# Patient Record
Sex: MALE | Race: WHITE | NOT HISPANIC OR LATINO | URBAN - METROPOLITAN AREA
[De-identification: names, ages, dates, MRNs, and addresses within clinical notes are randomized per-mention and may not be internally consistent; named-entity substitution may affect disease eponyms.]

---

## 2017-05-30 ENCOUNTER — AMBULATORY - RIVER FALLS (OUTPATIENT)
Dept: FAMILY MEDICINE | Facility: CLINIC | Age: 62
End: 2017-05-30

## 2017-08-31 ENCOUNTER — OFFICE VISIT - RIVER FALLS (OUTPATIENT)
Dept: FAMILY MEDICINE | Facility: CLINIC | Age: 62
End: 2017-08-31

## 2017-08-31 ASSESSMENT — MIFFLIN-ST. JEOR: SCORE: 1572.76

## 2017-09-01 LAB
CHOLEST SERPL-MCNC: 214 MG/DL
CHOLEST/HDLC SERPL: 3.9 {RATIO}
GLUCOSE BLD-MCNC: 84 MG/DL (ref 65–99)
HDLC SERPL-MCNC: 55 MG/DL
LDLC SERPL CALC-MCNC: 141 MG/DL
NONHDLC SERPL-MCNC: 159 MG/DL
TRIGL SERPL-MCNC: 79 MG/DL

## 2018-01-30 ENCOUNTER — OFFICE VISIT - RIVER FALLS (OUTPATIENT)
Dept: FAMILY MEDICINE | Facility: CLINIC | Age: 63
End: 2018-01-30

## 2019-02-11 ENCOUNTER — OFFICE VISIT - RIVER FALLS (OUTPATIENT)
Dept: FAMILY MEDICINE | Facility: CLINIC | Age: 64
End: 2019-02-11

## 2019-02-11 ASSESSMENT — MIFFLIN-ST. JEOR: SCORE: 1601.79

## 2019-02-12 LAB
CHOLEST SERPL-MCNC: 232 MG/DL
CHOLEST/HDLC SERPL: 4.1 {RATIO}
GLUCOSE BLD-MCNC: 83 MG/DL (ref 65–99)
HDLC SERPL-MCNC: 56 MG/DL
LDLC SERPL CALC-MCNC: 157 MG/DL
NONHDLC SERPL-MCNC: 176 MG/DL
TRIGL SERPL-MCNC: 82 MG/DL

## 2019-02-25 ENCOUNTER — OFFICE VISIT - RIVER FALLS (OUTPATIENT)
Dept: FAMILY MEDICINE | Facility: CLINIC | Age: 64
End: 2019-02-25

## 2019-03-04 ENCOUNTER — OFFICE VISIT - RIVER FALLS (OUTPATIENT)
Dept: FAMILY MEDICINE | Facility: CLINIC | Age: 64
End: 2019-03-04

## 2019-03-05 LAB — PSA SERPL-MCNC: 4 NG/ML

## 2019-03-18 ENCOUNTER — OFFICE VISIT - RIVER FALLS (OUTPATIENT)
Dept: FAMILY MEDICINE | Facility: CLINIC | Age: 64
End: 2019-03-18

## 2019-04-11 ENCOUNTER — OFFICE VISIT - RIVER FALLS (OUTPATIENT)
Dept: FAMILY MEDICINE | Facility: CLINIC | Age: 64
End: 2019-04-11

## 2019-04-11 ASSESSMENT — MIFFLIN-ST. JEOR: SCORE: 1604.51

## 2019-04-12 LAB
A/G RATIO - HISTORICAL: 1.6 (ref 1–2.5)
ALBUMIN SERPL-MCNC: 4.3 GM/DL (ref 3.6–5.1)
ALP SERPL-CCNC: 47 UNIT/L (ref 40–115)
ALT SERPL W P-5'-P-CCNC: 18 UNIT/L (ref 9–46)
AMYLASE SERPL-CCNC: 49 UNIT/L (ref 21–101)
AST SERPL W P-5'-P-CCNC: 17 UNIT/L (ref 10–35)
BILIRUB DIRECT SERPL-MCNC: 0.1 MG/DL
BILIRUB INDIRECT SERPL-MCNC: 0.3 MG/DL (ref 0.2–1.2)
BILIRUB SERPL-MCNC: 0.4 MG/DL (ref 0.2–1.2)
BUN SERPL-MCNC: 28 MG/DL (ref 7–25)
BUN/CREAT RATIO - HISTORICAL: 22 (ref 6–22)
CALCIUM SERPL-MCNC: 9.4 MG/DL (ref 8.6–10.3)
CHLORIDE BLD-SCNC: 104 MMOL/L (ref 98–110)
CO2 SERPL-SCNC: 29 MMOL/L (ref 20–32)
CREAT SERPL-MCNC: 1.3 MG/DL (ref 0.7–1.25)
EGFRCR SERPLBLD CKD-EPI 2021: 57 ML/MIN/1.73M2
ERYTHROCYTE [DISTWIDTH] IN BLOOD BY AUTOMATED COUNT: 12.8 % (ref 11–15)
GLOBULIN: 2.7 (ref 1.9–3.7)
GLUCOSE BLD-MCNC: 90 MG/DL (ref 65–99)
HCT VFR BLD AUTO: 45.6 % (ref 38.5–50)
HGB BLD-MCNC: 16.1 GM/DL (ref 13.2–17.1)
LIPASE SERPL-CCNC: 29 UNIT/L (ref 7–60)
MCH RBC QN AUTO: 29.5 PG (ref 27–33)
MCHC RBC AUTO-ENTMCNC: 35.3 GM/DL (ref 32–36)
MCV RBC AUTO: 83.5 FL (ref 80–100)
PLATELET # BLD AUTO: 195 10*3/UL (ref 140–400)
PMV BLD: 9.6 FL (ref 7.5–12.5)
POTASSIUM BLD-SCNC: 5.1 MMOL/L (ref 3.5–5.3)
PROT SERPL-MCNC: 7 GM/DL (ref 6.1–8.1)
RBC # BLD AUTO: 5.46 10*6/UL (ref 4.2–5.8)
SODIUM SERPL-SCNC: 139 MMOL/L (ref 135–146)
TSH SERPL DL<=0.005 MIU/L-ACNC: 2.6 MIU/L (ref 0.4–4.5)
WBC # BLD AUTO: 6.4 10*3/UL (ref 3.8–10.8)

## 2019-08-06 ENCOUNTER — OFFICE VISIT - RIVER FALLS (OUTPATIENT)
Dept: FAMILY MEDICINE | Facility: CLINIC | Age: 64
End: 2019-08-06

## 2019-08-20 ENCOUNTER — OFFICE VISIT - RIVER FALLS (OUTPATIENT)
Dept: FAMILY MEDICINE | Facility: CLINIC | Age: 64
End: 2019-08-20

## 2019-09-26 ENCOUNTER — OFFICE VISIT - RIVER FALLS (OUTPATIENT)
Dept: FAMILY MEDICINE | Facility: CLINIC | Age: 64
End: 2019-09-26

## 2019-09-26 ASSESSMENT — MIFFLIN-ST. JEOR: SCORE: 1599.98

## 2019-10-07 ENCOUNTER — OFFICE VISIT - RIVER FALLS (OUTPATIENT)
Dept: FAMILY MEDICINE | Facility: CLINIC | Age: 64
End: 2019-10-07

## 2019-10-07 ASSESSMENT — MIFFLIN-ST. JEOR: SCORE: 1599.07

## 2019-12-03 ENCOUNTER — OFFICE VISIT - RIVER FALLS (OUTPATIENT)
Dept: FAMILY MEDICINE | Facility: CLINIC | Age: 64
End: 2019-12-03

## 2019-12-03 ASSESSMENT — MIFFLIN-ST. JEOR: SCORE: 1580.92

## 2019-12-04 ENCOUNTER — COMMUNICATION - RIVER FALLS (OUTPATIENT)
Dept: FAMILY MEDICINE | Facility: CLINIC | Age: 64
End: 2019-12-04

## 2019-12-04 LAB
BUN SERPL-MCNC: 29 MG/DL (ref 7–25)
BUN/CREAT RATIO - HISTORICAL: 21 (ref 6–22)
CALCIUM SERPL-MCNC: 9.1 MG/DL (ref 8.6–10.3)
CHLORIDE BLD-SCNC: 106 MMOL/L (ref 98–110)
CO2 SERPL-SCNC: 27 MMOL/L (ref 20–32)
CREAT SERPL-MCNC: 1.4 MG/DL (ref 0.7–1.25)
EGFRCR SERPLBLD CKD-EPI 2021: 52 ML/MIN/1.73M2
GLUCOSE BLD-MCNC: 88 MG/DL (ref 65–99)
POTASSIUM BLD-SCNC: 4.5 MMOL/L (ref 3.5–5.3)
PSA SERPL-MCNC: 3.9 NG/ML
SODIUM SERPL-SCNC: 141 MMOL/L (ref 135–146)

## 2020-01-30 ENCOUNTER — OFFICE VISIT - RIVER FALLS (OUTPATIENT)
Dept: FAMILY MEDICINE | Facility: CLINIC | Age: 65
End: 2020-01-30

## 2020-01-30 ENCOUNTER — COMMUNICATION - RIVER FALLS (OUTPATIENT)
Dept: FAMILY MEDICINE | Facility: CLINIC | Age: 65
End: 2020-01-30

## 2020-01-30 LAB
ANION GAP SERPL CALCULATED.3IONS-SCNC: 8 MMOL/L (ref 5–18)
BUN SERPL-MCNC: 27 MG/DL (ref 8–25)
BUN/CREAT RATIO - HISTORICAL: 25 (ref 10–20)
CALCIUM SERPL-MCNC: 9.6 MG/DL (ref 8.5–10.5)
CHLORIDE BLD-SCNC: 104 MMOL/L (ref 98–110)
CO2 SERPL-SCNC: 26 MMOL/L (ref 21–31)
CREAT SERPL-MCNC: 1.08 MG/DL (ref 0.72–1.25)
D DIMER PPP FEU-MCNC: 0.29
ERYTHROCYTE [DISTWIDTH] IN BLOOD BY AUTOMATED COUNT: 13 % (ref 11.5–15.5)
GFR ESTIMATE EXT - HISTORICAL: >60
GLUCOSE BLD-MCNC: 99 MG/DL (ref 65–100)
HCT VFR BLD AUTO: 48.4 % (ref 37–53)
HGB BLD-MCNC: 16.8 G/DL (ref 13.5–17.5)
MCH RBC QN AUTO: 29.4 PG (ref 26–34)
MCHC RBC AUTO-ENTMCNC: 34.7 G/DL (ref 32–36)
MCV RBC AUTO: 85 FL (ref 80–100)
PLATELET # BLD AUTO: 198 10*3/UL (ref 140–440)
PMV BLD: 9.4 FL (ref 6.5–11)
POTASSIUM BLD-SCNC: 4.8 MMOL/L (ref 3.5–5)
RBC # BLD AUTO: 5.72 10*6/UL (ref 4.3–5.9)
SODIUM SERPL-SCNC: 138 MMOL/L (ref 135–145)
TROPONIN I - HISTORICAL: <0.01 NG/ML
TROPONIN I - HISTORICAL: <0.01 NG/ML
WBC # BLD AUTO: 6 10*3/UL (ref 4.5–11)

## 2020-01-30 ASSESSMENT — MIFFLIN-ST. JEOR: SCORE: 1603.6

## 2020-02-13 ENCOUNTER — OFFICE VISIT - RIVER FALLS (OUTPATIENT)
Dept: FAMILY MEDICINE | Facility: CLINIC | Age: 65
End: 2020-02-13

## 2022-02-11 VITALS
WEIGHT: 179 LBS | SYSTOLIC BLOOD PRESSURE: 102 MMHG | HEART RATE: 61 BPM | SYSTOLIC BLOOD PRESSURE: 124 MMHG | DIASTOLIC BLOOD PRESSURE: 78 MMHG | BODY MASS INDEX: 25.09 KG/M2 | WEIGHT: 179.2 LBS | HEART RATE: 78 BPM | HEIGHT: 71 IN | HEIGHT: 71 IN | TEMPERATURE: 97.8 F | DIASTOLIC BLOOD PRESSURE: 70 MMHG | BODY MASS INDEX: 25.06 KG/M2 | OXYGEN SATURATION: 98 %

## 2022-02-11 VITALS
TEMPERATURE: 97.1 F | SYSTOLIC BLOOD PRESSURE: 123 MMHG | WEIGHT: 173.2 LBS | BODY MASS INDEX: 24.25 KG/M2 | HEART RATE: 86 BPM | DIASTOLIC BLOOD PRESSURE: 86 MMHG | HEIGHT: 71 IN

## 2022-02-11 VITALS
DIASTOLIC BLOOD PRESSURE: 70 MMHG | HEIGHT: 71 IN | BODY MASS INDEX: 24.5 KG/M2 | OXYGEN SATURATION: 96 % | HEART RATE: 76 BPM | TEMPERATURE: 97.6 F | HEART RATE: 80 BPM | HEIGHT: 71 IN | HEIGHT: 71 IN | HEART RATE: 72 BPM | TEMPERATURE: 97.6 F | DIASTOLIC BLOOD PRESSURE: 70 MMHG | SYSTOLIC BLOOD PRESSURE: 110 MMHG | WEIGHT: 175 LBS | SYSTOLIC BLOOD PRESSURE: 126 MMHG | OXYGEN SATURATION: 95 % | DIASTOLIC BLOOD PRESSURE: 68 MMHG | WEIGHT: 180 LBS | SYSTOLIC BLOOD PRESSURE: 112 MMHG | BODY MASS INDEX: 25.2 KG/M2 | BODY MASS INDEX: 25.1 KG/M2

## 2022-02-11 VITALS
SYSTOLIC BLOOD PRESSURE: 128 MMHG | TEMPERATURE: 97.5 F | DIASTOLIC BLOOD PRESSURE: 70 MMHG | HEART RATE: 80 BPM | BODY MASS INDEX: 25.05 KG/M2 | WEIGHT: 179.6 LBS

## 2022-02-11 VITALS
HEIGHT: 71 IN | HEIGHT: 71 IN | TEMPERATURE: 97.2 F | DIASTOLIC BLOOD PRESSURE: 70 MMHG | DIASTOLIC BLOOD PRESSURE: 80 MMHG | SYSTOLIC BLOOD PRESSURE: 130 MMHG | BODY MASS INDEX: 25.05 KG/M2 | BODY MASS INDEX: 25.23 KG/M2 | SYSTOLIC BLOOD PRESSURE: 118 MMHG | HEART RATE: 78 BPM | HEART RATE: 74 BPM | OXYGEN SATURATION: 98 % | TEMPERATURE: 97.6 F | WEIGHT: 180.2 LBS

## 2022-02-11 VITALS
WEIGHT: 179.6 LBS | HEART RATE: 76 BPM | HEIGHT: 71 IN | SYSTOLIC BLOOD PRESSURE: 116 MMHG | DIASTOLIC BLOOD PRESSURE: 76 MMHG | TEMPERATURE: 97.8 F | BODY MASS INDEX: 25.15 KG/M2

## 2022-02-16 NOTE — LETTER
(Inserted Image. Unable to display)   August 22, 2019      DEANDRE WILKINSON   [address] [id number]        Dear DEANDRE,      Thank you for selecting [facility] for your healthcare needs.     You had an EGD on Tuesday August 20th, 2019. It was negative for celiac sprue, Hpylori and Eosinophilic Esophagitis. There was some chronic mild gastritis (stomach irrriation). Continue the prilosec and zoloft. Follow up in 3 weeks.            Please contact me or my assistant at [phone] if you have any questions or concerns.     Sincerely,        Alejandro Funes MD

## 2022-02-16 NOTE — PROGRESS NOTES
Patient:   YFN WILKINSON            MRN: [id number]            FIN: [id number]               Age:   65 years     Sex:  Male     :  1955   Associated Diagnoses:   Chest pain   Author:   Yfn Funes MD      Visit Information      Date of Service: 2020 04:20 pm  [facility]  Encounter#: [id number]      Primary Care Provider (PCP):  Yfn Funes MD    NPI# [id number]      Referring Provider:  Yfn Funes MD    NPI# [id number]      Chief Complaint   2020 4:29 PM CST    Follow up echo results      History of Present Illness   Started having chest pain about 3-4 weeks ago. Tried to get wood splitter going and pulled on it for an hour. Could have pulled some muscles. Pain did not happen at night. Pain happened during the day with activity. He was getting some flat steel off a shelf bothered his chest. Pain also happened when driving in a car and sitting. Pain happened several times a day for 5-15 minutes at a time. Took naprosyn and does not think it made a difference.  Able to walk to work carrying a bundle through the snow without pain  Has had a similar pain in past but goes away quicker  Had a normal stress echocardiogram a week ago and then pain has seemed to resolve. Mind felt at ease when called about normal stress test      Review of Systems   Constitutional:  No fever.    Respiratory:  No shortness of breath.    No chest pain or shortness of breath going up a flight of stairs  No leg swelling      Health Status   Allergies:    Allergic Reactions (Selected)  No Known Medication Allergies   Medications:  (Selected)   ,    Medications          No medications documented     Problem list:    All Problems  HLD (hyperlipidemia) / SNOMED CT 94378851 / Confirmed  Morbid obesity / SNOMED CT 740093136 / Probable  Tinnitus / SNOMED CT 081800178 / Confirmed      Histories   Procedure history:    Colonoscopy (SNOMED CT 205915066) performed by Yfn Funes MD on  2018 at 62 Years.  Comments:  2018 12:35 PM CST - Shruti Lawler  Sedation:  Fentanyl and Versed  Diverticuli:  Sigmoid  F/u in 10 years  Colonoscopy (SNOMED CT 092032803) performed by Yfn Funes MD on 2007 at 52 Years.  Esophagogastroduodenoscopy (SNOMED CT 438025037).  Comments:  2019 12:29 PM CDT - Yfn Funes MD  Normal EGD. Biopsies negative for EE, Hpylori, Celiac. Biopsies positive for cystic fundic gland polyp and chronic mild gastritis     Family History: Mom  of Alzheimers age 79. Dad  of CHF age 88. Sisters with hyperlipidemia. Brother and children healthy. Sister diagnosed with Pancreatic cancer age 55  Social History:  (Joan) 1975 going good. Nonsmoker. Minimal alcohol. 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 Hopes to retire . 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 Does not have to worry about year to year stress of crops any more. Has a hobby farm. Has restored tractors in the past       Physical Examination   Vital Signs   2020 4:29 PM CST Temperature Tympanic 97.6 DegF  LOW    Peripheral Pulse Rate 72 bpm    Pulse Site Radial artery    HR Method Manual    Systolic Blood Pressure 112 mmHg    Diastolic Blood Pressure 70 mmHg    Mean Arterial Pressure 84 mmHg    BP Site Right arm    BP Method Manual      Measurements from flowsheet : Measurements   2020 4:29 PM CST    Height Measured - Standard                71 in     General:  Alert and oriented, No acute distress.    Respiratory:  Lungs are clear to auscultation.    Cardiovascular:  Normal rate, Regular rhythm.    Musculoskeletal:  Normal gait.    Neurologic:  No focal deficits.    Psychiatric:  Appropriate mood & affect.       Review / Management   Stress echocardiogram (2020): no stress induced ischemia      Impression and Plan   Diagnosis     Chest pain (VHC41-JR R07.9).       Normal stress test.  Echocardiogram with possible right ventricular enlargement and will follow up with a formal echocardiogram    Addendum 2/24: Ordered Echocardiogram and hospital unable to reach him to schedule

## 2022-02-16 NOTE — NURSING NOTE
CAGE Assessment Entered On:  2/12/2019 10:07 AM CST    Performed On:  2/11/2019 10:07 AM CST by Becky Moreno               Assessment   Have you ever felt you should cut down on your drinking :   No   Have people annoyed you by criticizing your drinking :   No   Have you ever felt bad or guilty about your drinking :   No   Have you ever taken a drink first thing in the morning to steady your nerves or get rid of a hangover (Eye-opener) :   No   CAGE Score :   0    Becky Moreno - 2/12/2019 10:07 AM CST

## 2022-02-16 NOTE — NURSING NOTE
Hearing and Vision Screening Entered On:  2/11/2019 2:55 PM CST    Performed On:  2/11/2019 2:55 PM CST by Selam Partida CMA               Hearing and Vision Screening   Audiogram Result Right Ear :   Fail   Audiogram Result Left Ear :   Pass   Selam Partida CMA - 2/11/2019 2:55 PM CST   Hearing Screen Comments :   4000 Hz   Selam Partida CMA - 2/11/2019 4:11 PM CST

## 2022-02-16 NOTE — PROGRESS NOTES
Patient:   YFN WILKINSON            MRN: [id number]            FIN: [id number]               Age:   64 years     Sex:  Male     :  1955   Associated Diagnoses:   Chest pain   Author:   Yfn Funes MD      Visit Information      Date of Service: 2020 10:51 am  Performing Location: [facility]  Encounter#: [id number]      Primary Care Provider (PCP):  Yfn Funes MD    NPI# [id number]      Referring Provider:  Yfn Funes MD    NPI# [id number]      Chief Complaint   2020 10:57 AM CST   c/o chest pain for the past week, comes and goes, worse with activity, some dizziness      History of Present Illness   Started having chest pain about 10 days ago. Tried to get wood splitter going and pulled on it for an hour. Could have pulled some muscles. Pain does not happen at night. Pain happens during the day with activity.Getting some flat steel off a shelf bothered his chest today. Pain can also happen when driving in a car and sitting. Pain happening several times a day for 5-15 minutes at a time. Pain started again an hour ago.   Able to walk to work carrying a bundle through the snow without pain  Has had a similar pain in past but goes away quicker      Review of Systems   Constitutional:  No fever.    Respiratory:  No shortness of breath.    No chest pain or shortness of breath going up a flight of stairs  No leg swelling      Health Status   Allergies:    Allergic Reactions (Selected)  No Known Medication Allergies   Medications:  (Selected)      Problem list:    All Problems  HLD (hyperlipidemia) / SNOMED CT 67798426 / Confirmed  Morbid obesity / SNOMED CT 411852237 / Probable  Tinnitus / SNOMED CT 771786814 / Confirmed      Histories   Procedure history:    Colonoscopy (SNOMED CT 132487958) performed by Yfn Funes MD on 2018 at 62 Years.  Comments:  2018 12:35 PM CST - Shruti Lawler  Sedation:  Fentanyl and Versed  Diverticuli:  Sigmoid  F/u in 10  years  Colonoscopy (SNOMED CT 745127460) performed by Yfn Funes MD on 2007 at 52 Years.  Esophagogastroduodenoscopy (SNOMED CT 104491617).  Comments:  2019 12:29 PM CDT - Yfn Funes MD  Normal EGD. Biopsies negative for EE, Hpylori, Celiac. Biopsies positive for cystic fundic gland polyp and chronic mild gastritis     Family History: Mom  of Alzheimers age 79. Dad  of CHF age 88. Sisters with hyperlipidemia. Brother and children healthy. Sister diagnosed with Pancreatic cancer age 55  Social History:  (Joan) 1975 going good. Nonsmoker. Minimal alcohol. 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 Hopes to retire . 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 Does not have to worry about year to year stress of crops any more. Has a hobby farm. Has restored tractors in the past       Physical Examination   Vital Signs   2020 10:57 AM CST Temperature Tympanic 97.6 DegF  LOW    Peripheral Pulse Rate 76 bpm    Pulse Site Radial artery    HR Method Electronic    Systolic Blood Pressure 110 mmHg    Diastolic Blood Pressure 68 mmHg    Mean Arterial Pressure 82 mmHg    BP Site Right arm    BP Method Manual    Oxygen Saturation 95 %      Measurements from flowsheet : Measurements   2020 10:57 AM CST Height Measured - Standard 71 in    Weight Measured - Standard 180 lb    BSA 2.02 m2    Body Mass Index 25.1 kg/m2  HI      General:  Alert and oriented, No acute distress.    Neck:  No lymphadenopathy.    Respiratory:  Lungs are clear to auscultation.    Cardiovascular:  Normal rate, Regular rhythm.    Gastrointestinal:  Soft, Non-tender, Non-distended.    Musculoskeletal:  Normal gait.    Neurologic:  No focal deficits.    Psychiatric:  Appropriate mood & affect.       Review / Management   Results review:  Lab results   2020 11:32 AM CST Sodium Level 138 mmol/L    Potassium Level 4.8 mmol/L    Glucose Level 99  mg/dL    Creatinine Level 1.08 mg/dL    Troponin I <0.010 ng/mL    WBC 6.0    Hgb 16.8 g/dL    Platelet 198    D-Dimer 0.29     .    EKG: Sinus rhythm. Normal QTc and VT interval with no qwaves or ST changes. Good R wave progression   Chest x-ray: normal. Images reviewed with patient    Given aspirin 324mg, tylenol 1000mg and ibuprofen 800mg with resolution of pain    Follow up Troponin negative 1430      Impression and Plan   Diagnosis     Chest pain (PKL12-SP R07.9).       Likely musculoskeletal pain. Given age feel appropriate though to schedule a stress echocardiogram. Follow up in one week. Take naprosyn twice daily for a week    Addendum 2/6: Stress echocardiogram normal and talked with patient

## 2022-02-16 NOTE — NURSING NOTE
Comprehensive Intake Entered On:  10/7/2019 9:33 AM CDT    Performed On:  10/7/2019 9:30 AM CDT by Priti Tipton CMA               Summary   Chief Complaint :   Pt here for low BP, confusion, and dizziness   Weight Measured :   179 lb(Converted to: 179 lb 0 oz, 81.19 kg)    Height Measured :   71 in(Converted to: 5 ft 11 in, 180.34 cm)    Body Mass Index :   24.96 kg/m2   Body Surface Area :   2.01 m2   Systolic Blood Pressure :   102 mmHg   Diastolic Blood Pressure :   70 mmHg   Mean Arterial Pressure :   81 mmHg   Peripheral Pulse Rate :   61 bpm   Oxygen Saturation :   98 %   Race :      Languages :   English   Ethnicity :   Not  or    Priti Tipton CMA - 10/7/2019 9:30 AM CDT   Health Status   Allergies Verified? :   Yes   Medication History Verified? :   Yes   Medical History Verified? :   Yes   Pre-Visit Planning Status :   Completed   Tobacco Use? :   Never smoker   Priti Tipton CMA - 10/7/2019 9:30 AM CDT   Consents   Consent for Immunization Exchange :   Consent Granted   Consent for Immunizations to Providers :   Consent Granted   Priti Tipton CMA - 10/7/2019 9:30 AM CDT   Meds / Allergies   (As Of: 10/7/2019 9:33:17 AM CDT)   Allergies (Active)   No known allergies  Estimated Onset Date:   Unspecified ; Created By:   Ivette Reis; Reaction Status:   Active ; Category:   Drug ; Substance:   No known allergies ; Type:   Allergy ; Updated By:   Ivette Reis; Reviewed Date:   10/7/2019 9:33 AM CDT        Medication List   (As Of: 10/7/2019 9:33:17 AM CDT)   No Known Home Medications     Priti Tipton CMA - 10/7/2019 9:32:59 AM

## 2022-02-16 NOTE — TELEPHONE ENCOUNTER
Order is sent to [facility] and they will contact patient.

## 2022-02-16 NOTE — NURSING NOTE
Generalized Anxiety Disorder Screening Entered On:  2/25/2019 10:33 AM CST    Performed On:  2/25/2019 10:32 AM CST by Criselda Mary               Generalized Anxiety Disorder Screening   BUSHRA Nervous, Anxious On Edge :   Nearly every day   BUSHRA Control Worrying B :   Nearly every day   BUSHRA Worrying Too Much :   Nearly every day   BUSHRA Restless :   Not at all   BUSHRA Easily Annoyed/Irritable :   More than half the days   BUSHRA Afraid :   Nearly every day   BUSHRA Trouble Relaxing :   Nearly every day   BUSHRA Total Screening Score :   17    Criselda Mary - 2/25/2019 10:32 AM CST

## 2022-02-16 NOTE — TELEPHONE ENCOUNTER
---------------------  From: Tara Hanson CMA   Sent: 2/22/2019 9:12:35 AM CST  Subject: Results     Took direct call from FD w/ patient inquiring about his CT results. Gave verbal message from result letter. Patient was advised to f/u to review results. Transferred to scheduling

## 2022-02-16 NOTE — NURSING NOTE
Comprehensive Intake Entered On:  1/30/2020 11:02 AM CST    Performed On:  1/30/2020 10:57 AM CST by Selam Partida CMA               Summary   Chief Complaint :   c/o chest pain for the past week, comes and goes, worse with activity, some dizziness    Weight Measured :   180 lb(Converted to: 180 lb 0 oz, 81.65 kg)    Height Measured :   71 in(Converted to: 5 ft 11 in, 180.34 cm)    Body Mass Index :   25.1 kg/m2 (HI)    Body Surface Area :   2.02 m2   Systolic Blood Pressure :   110 mmHg   Diastolic Blood Pressure :   68 mmHg   Mean Arterial Pressure :   82 mmHg   Peripheral Pulse Rate :   76 bpm   BP Site :   Right arm   Pulse Site :   Radial artery   BP Method :   Manual   HR Method :   Electronic   Temperature Tympanic :   97.6 DegF(Converted to: 36.4 DegC)  (LOW)    Oxygen Saturation :   95 %   Race :      Languages :   English   Ethnicity :   Not  or    Selam Partida CMA - 1/30/2020 10:57 AM CST   Health Status   Allergies Verified? :   Yes   Medication History Verified? :   Yes   Medical History Verified? :   No   Pre-Visit Planning Status :   Not completed   Tobacco Use? :   Never smoker   Selam Partida CMA - 1/30/2020 10:57 AM CST   Consents   Consent for Immunization Exchange :   Consent Granted   Consent for Immunizations to Providers :   Consent Granted   Selam Partida CMA - 1/30/2020 10:57 AM CST   Meds / Allergies   (As Of: 1/30/2020 11:02:01 AM CST)   Allergies (Active)   No Known Medication Allergies  Estimated Onset Date:   Unspecified ; Created By:   Tara Hanson CMA; Reaction Status:   Active ; Category:   Drug ; Substance:   No Known Medication Allergies ; Type:   Allergy ; Updated By:   Tara Hanson CMA; Reviewed Date:   1/30/2020 10:57 AM CST        Medication List   (As Of: 1/30/2020 11:02:01 AM CST)

## 2022-02-16 NOTE — NURSING NOTE
Comprehensive Intake Entered On:  2/25/2019 8:11 AM CST    Performed On:  2/25/2019 8:08 AM CST by Selam Partida CMA               Summary   Chief Complaint :   Follow up CT results   Height Measured :   71 in(Converted to: 5 ft 11 in, 180.34 cm)    Systolic Blood Pressure :   118 mmHg   Diastolic Blood Pressure :   70 mmHg   Mean Arterial Pressure :   86 mmHg   Peripheral Pulse Rate :   78 bpm   BP Site :   Right arm   Pulse Site :   Radial artery   BP Method :   Manual   HR Method :   Manual   Temperature Tympanic :   97.2 DegF(Converted to: 36.2 DegC)  (LOW)    Race :      Languages :   English   Ethnicity :   Not  or    Selam Partida CMA - 2/25/2019 8:08 AM CST   Health Status   Allergies Verified? :   Yes   Medication History Verified? :   Yes   Medical History Verified? :   No   Pre-Visit Planning Status :   Completed   Tobacco Use? :   Never smoker   Selam Partida CMA - 2/25/2019 8:08 AM CST   Meds / Allergies   (As Of: 2/25/2019 8:11:49 AM CST)   Allergies (Active)   No known allergies  Estimated Onset Date:   Unspecified ; Created By:   Marie Colón CMA; Reaction Status:   Active ; Category:   Drug ; Substance:   No known allergies ; Type:   Allergy ; Updated By:   Marie Colón CMA; Reviewed Date:   2/25/2019 8:10 AM CST        Medication List   (As Of: 2/25/2019 8:11:49 AM CST)

## 2022-02-16 NOTE — TELEPHONE ENCOUNTER
Order, demographics and note faxed to [facility]. Patient is aware they will contact him to schedule.

## 2022-02-16 NOTE — RESULTS
Patient:   YFN WILKINSON            MRN: [id number]            FIN: [id number]               Age:   64 years     Sex:  Male     :  1955   Associated Diagnoses:   None   Author:   Yfn Funes MD      Procedure   EKG procedure   Indication: Chest pain.     EKG findings   Interpretation: by primary care provider.     Sinus rhythm. Normal QTc and VA interval with no qwaves or ST changes. Good R wave progression .        Impression and Plan   Diagnosis     normal EKG.

## 2022-02-16 NOTE — NURSING NOTE
Comprehensive Intake Entered On:  4/11/2019 3:11 PM CDT    Performed On:  4/11/2019 3:07 PM CDT by Selam Partida CMA               Summary   Chief Complaint :   c/o abdominal pain still not better    Weight Measured :   180.2 lb(Converted to: 180 lb 3 oz, 81.74 kg)    Height Measured :   71 in(Converted to: 5 ft 11 in, 180.34 cm)    Body Mass Index :   25.13 kg/m2 (HI)    Body Surface Area :   2.02 m2   Systolic Blood Pressure :   130 mmHg   Diastolic Blood Pressure :   80 mmHg   Mean Arterial Pressure :   97 mmHg   Peripheral Pulse Rate :   74 bpm   BP Site :   Right arm   Pulse Site :   Radial artery   BP Method :   Manual   HR Method :   Manual   Temperature Tympanic :   97.6 DegF(Converted to: 36.4 DegC)  (LOW)    Oxygen Saturation :   98 %   Race :      Languages :   English   Ethnicity :   Not  or    Selam Partida CMA - 4/11/2019 3:07 PM CDT   Health Status   Allergies Verified? :   Yes   Medication History Verified? :   Yes   Medical History Verified? :   No   Pre-Visit Planning Status :   Not completed   Tobacco Use? :   Never smoker   Selam Partida CMA - 4/11/2019 3:07 PM CDT   Demographics   Last Name :   MINH   Address :   [address]   First Name :   [place]   City :   [place]   State :   [place]   Zip Code :   [address]   Selam Partida CMA - 4/11/2019 3:07 PM CDT   Meds / Allergies   (As Of: 4/11/2019 3:11:14 PM CDT)   Allergies (Active)   No known allergies  Estimated Onset Date:   Unspecified ; Created By:   Marie Colón CMA; Reaction Status:   Active ; Category:   Drug ; Substance:   No known allergies ; Type:   Allergy ; Updated By:   Marie Colón CMA; Reviewed Date:   4/11/2019 3:08 PM CDT        Medication List   (As Of: 4/11/2019 3:11:14 PM CDT)

## 2022-02-16 NOTE — TELEPHONE ENCOUNTER
---------------------  From: Tara Hanson CMA   To: Referral Coordinators Pool ([id number]);     Sent: 3/11/2019 4:09:15 PM CDT  Subject: Reschedule Gen Surgery appt/Results     PCP:   LATIA      Time of Call:  1551       Person Calling:  Patient  Phone number:  [phone]    Returned call at: 1600    Note:   Patient called looking for lab results. Called him back to confirm it is for the PSA that was drawn on 3/4/19 per Dr Olson. Gave patient [facility]'s # and informed him to call as we cannot give out results. He also wanted to confirm an appointment for tomorrow. Documentation shows he had an appt today w/ Dr Iqbal @ 141. He states he was told it was tomorrow. Was informed a message would be sent to assist patient in rescheduling this.     Last office visit and reason:  2/25/19 Anxiety w/ LATIA---------------------  From: Yeni Lane (Referral Coordinators Pool ([id number]_[facility]))   To: Tara Hanson CMA;     Sent: 3/12/2019 9:23:57 AM CDT  Subject: RE: Reschedule Gen Surgery appt/Results     Called patient to give number to reschedule appt with Rasheed MARSHALL to call me back.

## 2022-02-16 NOTE — LETTER
(Inserted Image. Unable to display)   August 08, 2019      DEANDRE WILKINSON    [employer] [id number]        Dear DEANDRE,      Thank you for selecting [facility] for your healthcare needs.     Radiology report:    CONCLUSION:  1.  Small left hydrocele. Testicles within normal limits          Please contact me or my assistant at [phone] if you have any questions or concerns.     Sincerely,        Alejandro Funes MD

## 2022-02-16 NOTE — NURSING NOTE
Depression Screening Entered On:  2/12/2019 10:08 AM CST    Performed On:  2/11/2019 10:07 AM CST by Becky Moreno               Depression Screening   Feeling Down, Depressed, Hopeless :   Not at all   Little Interest - Pleasure in Activities :   Not at all   Initial Depression Screen Score :   0    Trouble Falling or Staying Asleep :   Not at all   Feeling Tired or Little Energy :   Not at all   Poor Appetite or Overeating :   Not at all   Feeling Bad About Yourself :   Several days   Trouble Concentrating :   Not at all   Moving or Speaking Slowly :   Not at all   Thoughts Better Off Dead or Hurting Self :   Not at all   Detailed Depression Screen Score :   1    Total Depression Screen Score :   1    BUSHRA Difficulty with Work, Home, Others :   Somewhat difficult   Becky Moreno - 2/12/2019 10:07 AM CST

## 2022-02-16 NOTE — NURSING NOTE
Depression Screening Entered On:  2/25/2019 10:33 AM CST    Performed On:  2/25/2019 10:32 AM CST by Criselda Mary               Depression Screening   Feeling Down, Depressed, Hopeless :   Several days   Little Interest - Pleasure in Activities :   Not at all   Initial Depression Screen Score :   1    Trouble Falling or Staying Asleep :   Several days   Feeling Tired or Little Energy :   Several days   Poor Appetite or Overeating :   Not at all   Feeling Bad About Yourself :   Several days   Trouble Concentrating :   Several days   Moving or Speaking Slowly :   Several days   Thoughts Better Off Dead or Hurting Self :   Not at all   Detailed Depression Screen Score :   5    Total Depression Screen Score :   6    BUSHRA Difficulty with Work, Home, Others :   Not difficult at all   Criselda Mary - 2/25/2019 10:32 AM CST

## 2022-02-16 NOTE — LETTER
(Inserted Image. Unable to display)   February 13, 2019      YFN WILKINSON       [address] [id number]        Dear YFN,    Thank you for selecting [facility] for your healthcare needs.  Below you will find the results of your recent tests done at our clinic.     No diabetes  Cholesterol little up from 2017 and risk of heart attack 12.1% over 10 years      Result Name Current Result Previous Result Reference Range   Glucose Level (mg/dL)  83 2/11/2019  84 8/31/2017 65 - 99   Cholesterol (mg/dL) ((H)) 232 2/11/2019 ((H)) 214 8/31/2017  - <200   HDL (mg/dL)  56 2/11/2019  55 8/31/2017 >40 -    LDL ((H)) 157 2/11/2019 ((H)) 141 8/31/2017    Triglyceride (mg/dL)  82 2/11/2019  79 8/31/2017  - <150       Please contact me or my assistant at [phone] if you have any questions about your results.     Sincerely,        Yfn Funes MD        What do your labs mean?  Below is a glossary of commonly ordered labs:  LDL   Bad Cholesterol   HDL   Good Cholesterol  AST/ALT   Liver Function   Cr/Creatinine   Kidney Function  Microalbumin   Kidney Function  BUN   Kidney Function  PSA   Prostate    TSH   Thyroid Hormone  HgbA1c   Diabetes Test   Hgb (Hemoglobin)   Red Blood Cells

## 2022-02-16 NOTE — PROGRESS NOTES
Patient:   YFN WILKINSON            MRN: [id number]            FIN: [id number]               Age:   64 years     Sex:  Male     :  1955   Associated Diagnoses:   Preoperative examination; Cataract of right eye   Author:   Yfn Funes MD      Preoperative Information   Indication for surgery:  Cataract.       Chief Complaint   12/3/2019 4:24 PM CST    Preoperative exam: DOS 2019 for right cataract w/ Dr Bower @ [facility]   Has had blurry vision for the past year.  Left cataract  and doing well      Review of Systems   Constitutional:  No fever, No chills, No sweats.    Ear/Nose/Mouth/Throat:  Negative.    Respiratory:  No shortness of breath.    Cardiovascular:  No chest pain.    Gastrointestinal:  No nausea, No vomiting, No diarrhea.    Genitourinary:  Negative.    Musculoskeletal:  Negative.    Integumentary:  No rash.    Neurologic:  Alert and oriented X4, No headache.    Psychiatric:  Negative.    All other systems reviewed and negative     No history of bleeding disorders or blood transfusion  No prior problems with anesthesia  No family history of anesthesia problems  No positive responses for sleep apnea  Denies plavix, coumadin  Denies history of DVT/PE  Denies recent corticosteroid use    Able to walk a flight of stairs without chest pain or shortness of breath.  No heart attack or stroke      Health Status   Allergies:    Allergic Reactions (Selected)  No Known Medication Allergies   Medications:  (Selected)      Problem list:    All Problems  HLD (hyperlipidemia) / SNOMED CT 57228701 / Confirmed  Morbid obesity / SNOMED CT 536783625 / Probable  Tinnitus / SNOMED CT 232372858 / Confirmed      Histories   Procedure history:    Colonoscopy (SNOMED CT 939350645) performed by Yfn Funes MD on 2018 at 62 Years.  Comments:  2018 12:35 PM CST - Shruti Lawler  Sedation:  Fentanyl and Versed  Diverticuli:  Sigmoid  F/u in 10 years  Colonoscopy (SNOMED CT 470376294) performed by Marin  Yfn HALL on 2007 at 52 Years.  Esophagogastroduodenoscopy (SNOMED CT 014185173).  Comments:  2019 12:29 PM CDT - Yfn Funes MD  Normal EGD. Biopsies negative for EE, Hpylori, Celiac. Biopsies positive for cystic fundic gland polyp and chronic mild gastritis     Family History: Mom  of Alzheimers age 79. Dad  of CHF age 88. Sisters with hyperlipidemia. Brother and children healthy. Sister diagnosed with Pancreatic cancer age 55  Social History:  (Joan) 1975 going good. Nonsmoker. Minimal alcohol. 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 Hopes to retire . 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 Does not have to worry about year to year stress of crops any more. Has a hobby farm. Has restored tractors in the past      Physical Examination   Vital Signs   12/3/2019 4:24 PM CST Peripheral Pulse Rate 80 bpm    Systolic Blood Pressure 126 mmHg    Diastolic Blood Pressure 70 mmHg    Mean Arterial Pressure 89 mmHg    BP Site Right arm    BP Method Manual    Oxygen Saturation 96 %      Measurements from flowsheet : Measurements   12/3/2019 4:24 PM CST Height Measured - Standard 71 in    Weight Measured - Standard 175 lb    BSA 1.99 m2    Body Mass Index 24.4 kg/m2      General:  Alert and oriented, No acute distress.    Eye:  Normal conjunctiva.    HENT:  Normocephalic, Tympanic membranes are clear.    Neck:  Non-tender, No lymphadenopathy.    Respiratory:  Lungs are clear to auscultation, Breath sounds are equal.    Cardiovascular:  Normal rate, Regular rhythm, No murmur.    Gastrointestinal:  Soft, Non-tender, Normal bowel sounds, No organomegaly.    Musculoskeletal:  Normal range of motion, Normal gait.    Integumentary:  Warm, Dry, Pink.    Neurologic:  Alert, Oriented, Normal sensory, No focal deficits.    Psychiatric:  Cooperative, Appropriate mood & affect.       Review / Management   Decreased hearing in left  ear and canal occluded by cerumen. Hearing improved with irrigation and removal of cerumen    Creatinine 1.3 April 2019 and creatinine 1.1 August 2019 and now creatinine 1.4 December 2019         Impression and Plan   Diagnosis     Preoperative examination (IOH16-DH Z01.818).     Cataract of right eye (QKR88-IE H26.9).       No contraindications to anesthesia    Mild renal insufficiency: continue to follow  Mild PSA elevation: recheck

## 2022-02-16 NOTE — PROGRESS NOTES
Patient:   DEANDRE WILKINSON            MRN: [id number]            FIN: [id number]               Age:   64 years     Sex:  Male     :  1955   Associated Diagnoses:   BPPV (benign paroxysmal positional vertigo)   Author:   Den Romeo MD      Impression and Plan   Diagnosis     BPPV (benign paroxysmal positional vertigo) (LAC92-PT H81.10).     Course:  Improving, resolving spontaneously.    Plan:    1. Conservative Management: Modify activity, Meclizine PRN, follow up  if worse or not improving.  .    Orders      Visit Information   Visit type:  New symptom.    Accompanied by:  No one.    Source of history:  Self.    History limitation:  None.       Chief Complaint   Chief complaint discussed and confirmed correct.     10/7/2019 9:30 AM CDT    Pt here for low BP, confusion, and dizziness        History of Present Illness             The patient presents with vertigo.  The vertigo is described as feeling of movement with change in head position - especially laying back..  The severity of the vertigo is moderate.  The vertigo is constant.  The vertigo has lasted for 3 hour(s).  Exacerbating factors consist of none.  Relieving factors consist of lying still and rest.  Associated symptoms consist of tinnitus, No focal weakness or loss of sensation, No fever or neck stiffness, No lightheadedness or syncope,  No palpitations., denies ear pain, denies hearing loss, denies headache, denies nausea, denies photophobia and denies visual disturbance.        Review of Systems   Constitutional:  Negative.    Eye:  Negative.    Ear/Nose/Mouth/Throat:  Negative.    Respiratory:  Negative.    Cardiovascular:  Negative.    Gastrointestinal:  Negative.    Genitourinary:  Negative.    Hematology/Lymphatics:  Negative.    Endocrine:  Negative.    Immunologic:  Negative.    Musculoskeletal:  Negative.    Integumentary:  Negative.    Neurologic:  Dizziness.    Psychiatric:  Negative.    All other systems reviewed and negative       Health Status   Allergies:    Allergic Reactions (Selected)  No known allergies   Medications:  (Selected)      Problem list:    All Problems  HLD (hyperlipidemia) / SNOMED CT 78704352 / Confirmed  Morbid obesity / SNOMED CT 835365073 / Probable  Tinnitus / SNOMED CT 802397335 / Confirmed      Histories   Past Medical History:    Active  Tinnitus (828935898): Onset in  at 39 years.  HLD (hyperlipidemia) (87477578)  Comments:  3/30/2012 CDT 9:35 AM CDT - Kathie Mejias  with elevated LDL   Family History:    Dementia  Mother (Susan, )  CA - Cancer  Grandparent  Comments:  3/30/2012 9:50 AM CDT - Kathie Mejias  prostate  Uncle (P)  Comments:  3/30/2012 9:52 AM CDT - Kathie Mejias  brain  Hypercholesterolemia  Father (Luis Fernando, )  Sister (Delfina)  Sister (Chiara)  Sister (Layla)  Hyperlipidemia  Sister (Estefania)  Sister (Tara)  CABG - Coronary artery bypass graft  Father (Luis Fernando, ): onset at 50 .  MI - Myocardial infarction  Uncle (P): onset at 51 years.  Comments:  3/30/2012 9:52 AM CDT - Kathie Mejias  2nd at 76.  CAD - Coronary artery disease  Uncle (P)  Arthritis  Father (Luis Fernando, )     Procedure history:    Colonoscopy (SNOMED CT 112189704) performed by Yfn Funes MD on 2018 at 62 Years.  Comments:  2018 12:35 PM CST - Shruti Lawler  Sedation:  Fentanyl and Versed  Diverticuli:  Sigmoid  F/u in 10 years  Colonoscopy (SNOMED CT 517158109) performed by Yfn Funes MD on 2007 at 52 Years.  Esophagogastroduodenoscopy (SNOMED CT 457520020).  Comments:  2019 12:29 PM CDT - Yfn Funes MD  Normal EGD. Biopsies negative for EE, Hpylori, Celiac. Biopsies positive for cystic fundic gland polyp and chronic mild gastritis      Physical Examination   Vital signs reviewed  and within acceptable limits    Vital Signs   10/7/2019 9:30 AM CDT Peripheral Pulse Rate 61 bpm    Systolic Blood Pressure 102 mmHg    Diastolic Blood Pressure 70 mmHg    Mean  Arterial Pressure 81 mmHg    Oxygen Saturation 98 %      Measurements from flowsheet : Measurements   10/7/2019 9:30 AM CDT Height Measured - Standard 71 in    Weight Measured - Standard 179 lb    BSA 2.01 m2    Body Mass Index 24.96 kg/m2      General:  No acute distress.    Eye:  Pupils are equal, round and reactive to light, Extraocular movements are intact, Normal conjunctiva, Vision unchanged.    HENT:  Normocephalic, Tympanic membranes are clear, Normal hearing, Oral mucosa is moist, No pharyngeal erythema, No sinus tenderness.    Neck:  Supple, No carotid bruit, No jugular venous distention, No lymphadenopathy, No thyromegaly.    Respiratory:  Lungs are clear to auscultation, Respirations are non-labored, Breath sounds are equal, Symmetrical chest wall expansion.    Cardiovascular:  Normal rate, Regular rhythm, No murmur, No gallop, Good pulses equal in all extremities, Normal peripheral perfusion, No edema.    Musculoskeletal:  Normal range of motion, Normal strength, Normal gait.    Integumentary:  Warm, Dry, Pink.    Neurologic:  Alert, Oriented, Normal sensory, Normal motor function, No focal deficits, Cranial Nerves II-XII are grossly intact, Normal deep tendon reflexes, Negative Jinny-Halpike Maneuver, Normal finger to nose and heel to shin.  Normal tandem walk..    Psychiatric:  Cooperative, Appropriate mood & affect, Normal judgment.       Review / Management   ECG interpretation:  Time  10/7/2019 10:23:00 AM, Rate  63  beats per minute, Within normal limits, Normal sinus rhythm, No ST-T changes, No ectopy, NORMAL.

## 2022-02-16 NOTE — LETTER
(Inserted Image. Unable to display)   December 04, 2019      YFN WILKINSON       [address] [id number]        Dear YFN,    Thank you for selecting [facility] for your healthcare needs.  Below you will find the results of your recent tests done at our clinic.     Creatinine a little higher. Drink one extra glass of water daily and recheck in March 2020      Result Name Current Result Previous Result Reference Range   Sodium Level (mmol/L)  141 12/3/2019  135 - 146   Potassium Level (mmol/L)  4.5 12/3/2019  3.5 - 5.3   Glucose Level (mg/dL)  88 12/3/2019  65 - 99   Creatinine Level (mg/dL) ((H)) 1.40 12/3/2019 ((H)) 1.30 4/11/2019 0.70 - 1.25       Please contact me or my assistant at [phone] if you have any questions about your results.     Sincerely,        Yfn Funes MD        What do your labs mean?  Below is a glossary of commonly ordered labs:  LDL   Bad Cholesterol   HDL   Good Cholesterol  AST/ALT   Liver Function   Cr/Creatinine   Kidney Function  Microalbumin   Kidney Function  BUN   Kidney Function  PSA   Prostate    TSH   Thyroid Hormone  HgbA1c   Diabetes Test   Hgb (Hemoglobin)   Red Blood Cells

## 2022-02-16 NOTE — PROGRESS NOTES
Patient:   YFN WILKINSON            MRN: [id number]            FIN: [id number]               Age:   64 years     Sex:  Male     :  1955   Associated Diagnoses:   Anxiety   Author:   Yfn Funes MD      Visit Information      Date of Service: 2019 04:40 pm  Performing Location: [facility]  Encounter#: [id number]      Primary Care Provider (PCP):  Yfn Funes MD    NPI# [id number]      Referring Provider:  Yfn Funes MD    NPI# [id number]      Chief Complaint   2019 4:52 PM CDT    Follow up discuss medications        History of Present Illness   Feels getting better sleep on zoloft. Stomach is much less upset. Feels good going forward. Energy better. Having a negative MRCP is reassuring to him with sister diagnosis of pancreatic cancer 2018.    Feels lousy everyday in the morning. Feels good at night. Does also feel good at times during the day. Has a mountain dew once a week and feels great after it for an hour. Gives him a boost and stomach feels good. Does feel good at time for a few hours. Able to go to work. Symptoms do not stop activity.  Upset stomach for the past 8 months. Less when sitting and resting, increased with movement. Sleeping through the night. Appetite normal. No significant weight loss. Feels in the middle of the abdomen. Does not notice it when focused on something. Eating makes him feel better.  Symptoms started when heard the news about sister having pancreatic cancer.  Son out of half-way for failure to pay child support. He is addicted to drugs. Son lives with him. It is a big stressor. One daughter in medical field and other daughter is a teacher.    Sister and brother with significant anxiety    Having some intermittent left testicle pain over the past week.   Normal Colonoscopy 2018  EGD mild gastritis 2019  MRCP negative       Review of Systems   Constitutional:  No fever.    Respiratory:  No shortness of breath.    Cardiovascular:  No chest  pain.    Gastrointestinal:  No vomiting.    Nocturia 1-2x at night  PHQ-9: 2pts (2019). 6 pts (2019). 6pts (2019)  MDQ: 1pt moderate problem (2019)  BUSHRA-7: 1pt (2019)8pts (2019). 17pts (2019)      Health Status   Allergies:    Allergic Reactions (Selected)  No Known Medication Allergies   Medications:  (Selected)   Prescriptions  Prescribed  LamISIL AT 1% topical cream: 1 milton, top, bid, # 30 gm, 0 Refill(s), Type: Maintenance, [facility]acy: [facility] #[id number], 1 milton Topical bid  Zoloft 25 mg oral tablet: = 1 tab(s) ( 25 mg ), Oral, daily, # 30 tab(s), 1 Refill(s), Type: Maintenance, [facility]acy: [facility] #[id number], 1 tab(s) Oral daily  omeprazole 20 mg oral delayed release tablet: = 1 tab(s) ( 20 mg ), po, daily, # 30 tab(s), 1 Refill(s), Type: Maintenance, [facility]acy: [facility] #[id number], 1 tab(s) Oral daily   Problem list:    All Problems  HLD (hyperlipidemia) / SNOMED CT 94658410 / Confirmed  Morbid obesity / SNOMED CT 797137363 / Probable  Tinnitus / SNOMED CT 658068693 / Confirmed      Histories   Procedure history:    Colonoscopy (SNOMED CT 272902255) performed by Yfn Funes MD on 2018 at 62 Years.  Comments:  2018 12:35 PM CST - Shruti Lawler  Sedation:  Fentanyl and Versed  Diverticuli:  Sigmoid  F/u in 10 years  Colonoscopy (SNOMED CT 529994336) performed by Yfn Funes MD on 2007 at 52 Years.  Esophagogastroduodenoscopy (SNOMED CT 962688969).  Comments:  2019 12:29 PM CDT - Yfn Funes MD  Normal EGD. Biopsies negative for EE, Hpylori, Celiac. Biopsies positive for cystic fundic gland polyp and chronic mild gastritis     Family History: Mom  of Alzheimers age 79. Dad  of CHF age 88. Sisters with hyperlipidemia. Brother and children healthy. Sister diagnosed with Pancreatic cancer age 55  Social History:  (Joan) 1975 going good. Nonsmoker. Minimal alcohol. 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 Hopes to retire 2020. 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 Does not have to worry about year to year stress of crops any more. Has a hobby farm. Has restored tractors in the past      Physical Examination   Vital Signs   9/26/2019 4:52 PM CDT Temperature Tympanic 97.8 DegF  LOW    Peripheral Pulse Rate 78 bpm    Pulse Site Radial artery    HR Method Manual    Systolic Blood Pressure 124 mmHg    Diastolic Blood Pressure 78 mmHg    Mean Arterial Pressure 93 mmHg    BP Site Right arm    BP Method Manual      Measurements from flowsheet : Measurements   9/26/2019 4:52 PM CDT Height Measured - Standard 71 in    Weight Measured - Standard 179.2 lb    BSA 2.02 m2    Body Mass Index 24.99 kg/m2      General:  Alert and oriented, No acute distress.    Neck:  No lymphadenopathy.    Respiratory:  Lungs are clear to auscultation.    Cardiovascular:  Normal rate, Regular rhythm.    Gastrointestinal:  Soft, Non-tender, Non-distended.    Musculoskeletal:  Normal gait.    Neurologic:  No focal deficits.    Psychiatric:  Appropriate mood & affect.       Impression and Plan   Diagnosis     Anxiety (DPK04-ZV F41.9).       Anxiety: improved on zoloft and likely had been causing the abdominal pain. Reassurance has helped with testing. Continue to recommend counseling. Follow up with any concerns  Patient wants to discontinue the omeprazole and zoloft for now    Prostate enlargement: nodular impression on the bladder and Urologist felt benign. Did not take flomax

## 2022-02-16 NOTE — PROGRESS NOTES
Patient:   YFN WILKINSON            MRN: [id number]            FIN: [id number]               Age:   64 years     Sex:  Male     :  1955   Associated Diagnoses:   Prostate enlargement; Anxiety   Author:   Yfn Funes MD      Visit Information      Date of Service: 2019 08:00 am  Performing Location: [facility]  Encounter#: [id number]      Primary Care Provider (PCP):  Yfn Funes MD    NPI# [id number]      Referring Provider:  Yfn Funes MD    NPI# [id number]      Chief Complaint   2019 8:08 AM CST    Follow up CT results      History of Present Illness     Upset stomach for the past 8 weeks. Less when sitting and resting, increased with movement. Sleeping through the night. Appetite normal. No significant weight loss. Feels in the middle of the abdomen. Does not notice it when focused on something. Seems to be triggered by eating in the morning.  Symptoms started when heard the new about sister.   Son in snf for failure to pay child support. He is addicted to drugs. Son lives with him. It is a big stressor.  One daughter in medical field and other daughter is a teacher.      Review of Systems   Constitutional:  No fever.    Gastrointestinal:  No vomiting.    Nocturia 1-2x at night  PHQ-9: 6pts (2019)  BUSHRA-7: 17pts (2019)      Health Status   Allergies:    Allergic Reactions (Selected)  No known allergies   Medications:  (Selected)      Problem list:    All Problems  HLD (hyperlipidemia) / SNOMED CT 13131222 / Confirmed  Morbid obesity / SNOMED CT 010818196 / Probable  Tinnitus / SNOMED CT 821936284 / Confirmed      Histories   Procedure history:    Colonoscopy (SNOMED CT 327430919) performed by Yfn Funes MD on 2018 at 62 Years.  Comments:  2018 12:35 PM CST - Shruti Lawelr  Sedation:  Fentanyl and Versed  Diverticuli:  Sigmoid  F/u in 10 years  Colonoscopy (SNOMED CT 169510127) performed by Yfn Funes MD on 2007 at 52  Years.     Family History: Mom  of Alzheimers age 79. Dad  of CHF age 88. Sisters with hyperlipidemia. Brother and children healthy. Sister diagnosed with Pancreatic cancer age 55  Social History:  (Joan) 1975 going good. Nonsmoker. Minimal alcohol. 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 Hopes to retire . 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 Does not have to worry about year to year stress of crops any more. Has a hobby farm. Has restored tractors in the past      Physical Examination   Vital Signs   2019 8:08 AM CST Temperature Tympanic 97.2 DegF  LOW    Peripheral Pulse Rate 78 bpm    Pulse Site Radial artery    HR Method Manual    Systolic Blood Pressure 118 mmHg    Diastolic Blood Pressure 70 mmHg    Mean Arterial Pressure 86 mmHg    BP Site Right arm    BP Method Manual      Measurements from flowsheet : Measurements   2019 8:08 AM CST    Height Measured - Standard                71 in     General:  Alert and oriented, No acute distress.    Respiratory:  Lungs are clear to auscultation.    Cardiovascular:  Normal rate, Regular rhythm.    Gastrointestinal:  Soft, Non-tender, Non-distended.    Musculoskeletal:  Normal gait.    Neurologic:  No focal deficits.    Psychiatric:  Appropriate mood & affect.       Review / Management   CT abdomen: 2019  CONCLUSION:   1.  Prostatic enlargement with nodular impression on the base of the bladder.  2.  Small bilateral fat-containing inguinal hernias.  3.  No acute inflammatory process or adenopathy in the abdomen or pelvis.      Impression and Plan   Diagnosis     Prostate enlargement (PPB34-OD N40.0).     Anxiety (SNX00-DJ F41.9).       Prostate enlargement: nodular impression on the bladder and refer to Urologist  Anxiety: feel that is causing his abdominal pain. Recommend counseling. Discussed possible GERD but patient declines medication at this time. GERD  handout given.    Addendum 3/9: Talked with Urology. Urology consult reviewed and no comment on CT abdomen. Discussed and Urology reviewed CT abdomen and felt enlarged prostate. Started flomax

## 2022-02-16 NOTE — LETTER
(Inserted Image. Unable to display)         March 05, 2020        YFN WILKINSON   [address] [id number]        Dear YFN,    Thank you for selecting [facility] for your healthcare needs.    Our records indicate you are due for the following services:     Non-Fasting Labs    If you had your labs done at another facility or with Direct Access Lab Testing at [facility], please bring in a copy of the results to your next visit, mail a copy, or drop off a copy of your results to your Healthcare Provider.     To schedule an appointment or if you have further questions, please contact your primary clinic:   [facility]       [phone]   [facility]       [phone]              [facility]     [phone]      Powered by [employer] (PRIMP) and [employer]    Sincerely,    Yfn Funes MD

## 2022-02-16 NOTE — LETTER
(Inserted Image. Unable to display)   December 03, 2019      DEANDRE WILKINSON    [address] [id number]        Dear DEANDRE,      Thank you for selecting [facility] for your healthcare needs.     Seen in clinic today          Please contact me or my assistant at [phone] if you have any questions or concerns.     Sincerely,        Alejandro Funes MD

## 2022-02-16 NOTE — NURSING NOTE
Depression Screening Entered On:  4/12/2019 2:41 PM CDT    Performed On:  4/12/2019 2:41 PM CDT by Tara Hanson CMA               Depression Screening   Little Interest - Pleasure in Activities :   More than half the days   Feeling Down, Depressed, Hopeless :   More than half the days   Initial Depression Screen Score :   4    Trouble Falling or Staying Asleep :   Not at all   Feeling Tired or Little Energy :   Several days   Poor Appetite or Overeating :   Not at all   Feeling Bad About Yourself :   Several days   Trouble Concentrating :   Not at all   Moving or Speaking Slowly :   Not at all   Thoughts Better Off Dead or Hurting Self :   Not at all   Detailed Depression Screen Score :   2    Total Depression Screen Score :   6    BUSHRA Difficulty with Work, Home, Others :   Somewhat difficult   Tara Hanson CMA - 4/12/2019 2:41 PM CDT

## 2022-02-16 NOTE — NURSING NOTE
Comprehensive Intake Entered On:  9/26/2019 4:57 PM CDT    Performed On:  9/26/2019 4:52 PM CDT by Selam Partida CMA               Summary   Chief Complaint :   Follow up discuss medications   Weight Measured :   179.2 lb(Converted to: 179 lb 3 oz, 81.28 kg)    Height Measured :   71 in(Converted to: 5 ft 11 in, 180.34 cm)    Body Mass Index :   24.99 kg/m2   Body Surface Area :   2.02 m2   Systolic Blood Pressure :   124 mmHg   Diastolic Blood Pressure :   78 mmHg   Mean Arterial Pressure :   93 mmHg   Peripheral Pulse Rate :   78 bpm   BP Site :   Right arm   Pulse Site :   Radial artery   BP Method :   Manual   HR Method :   Manual   Temperature Tympanic :   97.8 DegF(Converted to: 36.6 DegC)  (LOW)    Race :      Languages :   English   Ethnicity :   Not  or    Selam Partida CMA - 9/26/2019 4:52 PM CDT   Health Status   Allergies Verified? :   Yes   Medication History Verified? :   Yes   Medical History Verified? :   No   Pre-Visit Planning Status :   Completed   Tobacco Use? :   Never smoker   Selam Partida CMA - 9/26/2019 4:52 PM CDT   Meds / Allergies   (As Of: 9/26/2019 4:57:02 PM CDT)   Allergies (Active)   No Known Medication Allergies  Estimated Onset Date:   Unspecified ; Created By:   Ivette Reis; Reaction Status:   Active ; Category:   Drug ; Substance:   No Known Medication Allergies ; Type:   Allergy ; Updated By:   Ivette Reis; Reviewed Date:   9/26/2019 4:54 PM CDT        Medication List   (As Of: 9/26/2019 4:57:02 PM CDT)   Prescription/Discharge Order    omeprazole  :   omeprazole ; Status:   Prescribed ; Ordered As Mnemonic:   omeprazole 20 mg oral delayed release tablet ; Simple Display Line:   20 mg, 1 tab(s), po, daily, 30 tab(s), 1 Refill(s) ; Ordering Provider:   Yfn Funes MD; Catalog Code:   omeprazole ; Order Dt/Tm:   8/20/2019 9:00:58 AM          sertraline  :   sertraline ; Status:   Prescribed ; Ordered As Mnemonic:   Zoloft 25 mg oral tablet  ; Simple Display Line:   25 mg, 1 tab(s), Oral, daily, 30 tab(s), 1 Refill(s) ; Ordering Provider:   Yfn Funes MD; Catalog Code:   sertraline ; Order Dt/Tm:   8/20/2019 9:01:07 AM          terbinafine topical  :   terbinafine topical ; Status:   Prescribed ; Ordered As Mnemonic:   LamISIL AT 1% topical cream ; Simple Display Line:   1 milton, top, bid, 30 gm, 0 Refill(s) ; Ordering Provider:   Yfn Funes MD; Catalog Code:   terbinafine topical ; Order Dt/Tm:   8/6/2019 4:08:04 PM

## 2022-02-16 NOTE — TELEPHONE ENCOUNTER
---------------------  From: Yfn Funes MD   To: Selam Partida CMA;     Sent: 4/12/2019 9:18:47 AM CDT  Subject: General surgery consult     Massiel  Have my note and the labs sent to Dr Iqbal for the consult on April 22nd  [place] and labs were faxed to [facility] [phone]        JAYSON Partida CMA

## 2022-02-16 NOTE — NURSING NOTE
Comprehensive Intake Entered On:  2/13/2020 4:32 PM CST    Performed On:  2/13/2020 4:29 PM CST by Selam Partida CMA               Summary   Chief Complaint :   Follow up echo results   Height Measured :   71 in(Converted to: 5 ft 11 in, 180.34 cm)    Systolic Blood Pressure :   112 mmHg   Diastolic Blood Pressure :   70 mmHg   Mean Arterial Pressure :   84 mmHg   Peripheral Pulse Rate :   72 bpm   BP Site :   Right arm   Pulse Site :   Radial artery   BP Method :   Manual   HR Method :   Manual   Temperature Tympanic :   97.6 DegF(Converted to: 36.4 DegC)  (LOW)    Race :      Languages :   English   Ethnicity :   Not  or    Selam Partida CMA - 2/13/2020 4:29 PM CST   Health Status   Allergies Verified? :   Yes   Medication History Verified? :   Yes   Medical History Verified? :   No   Pre-Visit Planning Status :   Completed   Tobacco Use? :   Never smoker   Selam Partida CMA - 2/13/2020 4:29 PM CST   Consents   Consent for Immunization Exchange :   Consent Granted   Consent for Immunizations to Providers :   Consent Granted   Selam Partida CMA - 2/13/2020 4:29 PM CST   Meds / Allergies   (As Of: 2/13/2020 4:32:18 PM CST)   Allergies (Active)   No Known Medication Allergies  Estimated Onset Date:   Unspecified ; Created By:   Tara Hanson CMA; Reaction Status:   Active ; Category:   Drug ; Substance:   No Known Medication Allergies ; Type:   Allergy ; Updated By:   Tara Hanson CMA; Reviewed Date:   2/13/2020 4:30 PM CST        Medication List   (As Of: 2/13/2020 4:32:18 PM CST)

## 2022-02-16 NOTE — NURSING NOTE
Depression Screening Entered On:  9/27/2019 8:29 AM CDT    Performed On:  9/26/2019 8:29 AM CDT by Nanette Pérez               Depression Screening   Little Interest - Pleasure in Activities :   Not at all   Feeling Down, Depressed, Hopeless :   Not at all   Initial Depression Screen Score :   0    Trouble Falling or Staying Asleep :   Several days   Feeling Tired or Little Energy :   Not at all   Poor Appetite or Overeating :   Not at all   Feeling Bad About Yourself :   Several days   Trouble Concentrating :   Not at all   Moving or Speaking Slowly :   Not at all   Thoughts Better Off Dead or Hurting Self :   Not at all   Detailed Depression Screen Score :   2    Total Depression Screen Score :   2    BUSHRA Difficulty with Work, Home, Others :   Somewhat difficult   Nanette Pérez - 9/27/2019 8:29 AM CDT

## 2022-02-16 NOTE — NURSING NOTE
Generalized Anxiety Disorder Screening Entered On:  9/27/2019 8:32 AM CDT    Performed On:  9/26/2019 8:29 AM CDT by Nanette Pérez               Generalized Anxiety Disorder Screening   BUSHRA Nervous, Anxious On Edge :   Not at all   BUSHRA Control Worrying B :   Several days   BUSHRA Worrying Too Much :   Not at all   BUSHRA Restless :   Not at all   BUSHRA Easily Annoyed/Irritable :   Not at all   BUSHRA Afraid :   Not at all   BUSHRA Trouble Relaxing :   Not at all   BUSHRA Total Screening Score :   1    BUSHRA Difficulty with Work, Home, Others :   Somewhat difficult   Nanette Pérez - 9/27/2019 8:29 AM CDT

## 2022-02-16 NOTE — RESULTS
(Inserted Image. Unable to display)          (Inserted Image. Unable to display)     [address], [place] [address]  Phone [phone]      ELECTROCARDIOGRAM REPORT    DEANDRE WILKINSON :  1955  DATE OF EXAM:  10/07/2019 MRN:  [id number]   Ordering HCP:  Den Romeo MD       Indication:  BPPV.    Findings:  Rate 63 beats per minute.  AZ interval 182 milliseconds.  QT interval 378 milliseconds.  Normal axis.      Interpretation:  Normal sinus rhythm.  Normal P wave.  Normal QRS complex.  Normal ST-T wave complex.       Conclusion:  Normal.     Den Romeo MD   Interpreting HCP    JAMSHID/kyrie  D:  10/09/2019  T:  10/10/2019    ELECTROCARDIOGRAM REPORT

## 2022-02-16 NOTE — NURSING NOTE
PT was given Tylenol 1000mg, Aspirin 324 mg chewable and Ibuprofen 800 mg given per order  of Dr. Funes for chest pain at 12:10.        JAYSON Partida CMA

## 2022-02-16 NOTE — NURSING NOTE
CAGE Assessment Entered On:  2/25/2019 10:33 AM CST    Performed On:  2/25/2019 10:32 AM CST by Criselda Mary               Assessment   Have you ever felt you should cut down on your drinking :   No   Have people annoyed you by criticizing your drinking :   No   Have you ever felt bad or guilty about your drinking :   No   Have you ever taken a drink first thing in the morning to steady your nerves or get rid of a hangover (Eye-opener) :   No   CAGE Score :   0    Criselda Mary - 2/25/2019 10:32 AM CST

## 2022-02-16 NOTE — NURSING NOTE
Comprehensive Intake Entered On:  8/6/2019 3:43 PM CDT    Performed On:  8/6/2019 3:40 PM CDT by Ivette Reis               Summary   Chief Complaint :   f/up from visit this spring, continued stomach problems. Never started medications that were prescribed at last visit.    Weight Measured :   179.6 lb(Converted to: 179 lb 10 oz, 81.47 kg)    Systolic Blood Pressure :   128 mmHg   Diastolic Blood Pressure :   70 mmHg   Mean Arterial Pressure :   89 mmHg   Peripheral Pulse Rate :   80 bpm   Temperature Tympanic :   97.5 DegF(Converted to: 36.4 DegC)  (LOW)    Race :      Languages :   English   Ethnicity :   Not  or    Ivette Reis - 8/6/2019 3:40 PM CDT   Health Status   Allergies Verified? :   Yes   Medication History Verified? :   Yes   Tobacco Use? :   Never smoker   Ivette Reis - 8/6/2019 3:40 PM CDT   Meds / Allergies   (As Of: 8/6/2019 3:43:36 PM CDT)   Allergies (Active)   No Known Medication Allergies  Estimated Onset Date:   Unspecified ; Created By:   Ivette Reis; Reaction Status:   Active ; Category:   Drug ; Substance:   No Known Medication Allergies ; Type:   Allergy ; Updated By:   Ivette Reis; Reviewed Date:   8/6/2019 3:40 PM CDT        Medication List   (As Of: 8/6/2019 3:43:36 PM CDT)   Prescription/Discharge Order    omeprazole  :   omeprazole ; Status:   Prescribed ; Ordered As Mnemonic:   omeprazole 20 mg oral delayed release tablet ; Simple Display Line:   20 mg, 1 tab(s), Oral, daily, 30 tab(s), 1 Refill(s) ; Ordering Provider:   Yfn Funes MD; Catalog Code:   omeprazole ; Order Dt/Tm:   4/11/2019 3:32:37 PM          sertraline  :   sertraline ; Status:   Prescribed ; Ordered As Mnemonic:   Zoloft 25 mg oral tablet ; Simple Display Line:   25 mg, 1 tab(s), PO, Daily, 30 tab(s), 1 Refill(s) ; Ordering Provider:   Yfn Funes MD; Catalog Code:   sertraline ; Order Dt/Tm:   4/11/2019 3:32:25 PM

## 2022-02-16 NOTE — PROGRESS NOTES
Patient:   YFN WILKINSON            MRN: [id number]            FIN: [id number]               Age:   64 years     Sex:  Male     :  1955   Associated Diagnoses:   Anxiety; Abdominal pain; Balanitis; Pain in left testicle   Author:   Yfn Funes MD      Visit Information      Date of Service: 2019 03:31 pm  Performing Location: [facility]  Encounter#: [id number]      Primary Care Provider (PCP):  Yfn Funes MD    NPI# [id number]      Referring Provider:  Yfn Funes MD    NPFOX# [id number]      Chief Complaint   2019 3:40 PM CDT     f/up from visit this spring, continued stomach problems. Never started medications that were prescribed at last visit.      History of Present Illness   Feels lousy everyday in the morning. Feels good at night. Does also feel good at times during the day. Has a mountain dew once a week and feels great after it for an hour. Gives him a boost and stomach feels good. Does feel good at time for a few hours. Able to go to work. Symptoms do not stop activity.  Upset stomach for the past 8 months. Less when sitting and resting, increased with movement. Sleeping through the night. Appetite normal. No significant weight loss. Feels in the middle of the abdomen. Does not notice it when focused on something. Eating makes him feel better.  Symptoms started when heard the new about sister.   Son out of residential for failure to pay child support. He is addicted to drugs. Son lives with him. It is a big stressor.  One daughter in medical field and other daughter is a teacher.  Denies diarrhea and constipation. No blood in stool.  Sometimes feels like someone took his energy. Things pass at times.  Sister and brother with significant anxiety  No dysphagia    Having some intermittent left testicle pain over the past week.   Normal Colonoscopy 2018      Review of Systems   Constitutional:  No fever.    Respiratory:  No shortness of breath.    Cardiovascular:  No chest  pain.    Gastrointestinal:  No vomiting.    Nocturia 1-2x at night  PHQ-9: 6 pts (2019). 6pts (2019)  MDQ: 1pt moderate problem (2019)  BUSHRA-7: 8pts (2019). 17pts (2019)    No sleep apnea  No blood transfusions  No anesthesia problems  No history of DVT/PE      Health Status   Allergies:    Allergic Reactions (Selected)  No Known Medication Allergies   Medications:  (Selected)   Prescriptions  Prescribed  Zoloft 25 mg oral tablet: = 1 tab(s) ( 25 mg ), PO, Daily, # 30 tab(s), 1 Refill(s), Type: Maintenance, Pharmacy: [id number], 1 tab(s) Oral daily  omeprazole 20 mg oral delayed release tablet: = 1 tab(s) ( 20 mg ), Oral, daily, # 30 tab(s), 1 Refill(s), Type: Maintenance, Pharmacy: [id number], 1 tab(s) Oral daily   Problem list:    All Problems  HLD (hyperlipidemia) / SNOMED CT 15860310 / Confirmed  Morbid obesity / SNOMED CT 780034810 / Probable  Tinnitus / SNOMED CT 177798471 / Confirmed      Histories   Procedure history:    Colonoscopy (SNOMED CT 212142241) performed by Yfn Funes MD on 2018 at 62 Years.  Comments:  2018 12:35 PM JOSE JUAN - Shruti Lawler  Sedation:  Fentanyl and Versed  Diverticuli:  Sigmoid  F/u in 10 years  Colonoscopy (SNOMED CT 712704234) performed by Yfn Funes MD on 2007 at 52 Years.     Family History: Mom  of Alzheimers age 79. Dad  of CHF age 88. Sisters with hyperlipidemia. Brother and children healthy. Sister diagnosed with Pancreatic cancer age 55  Social History:  (Joan) 1975 going good. Nonsmoker. Minimal alcohol. 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 Hopes to retire . 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 Does not have to worry about year to year stress of crops any more. Has a hobby farm. Has restored tractors in the past      Physical Examination   Vital Signs   2019 3:40 PM CDT Temperature  Tympanic 97.5 DegF  LOW    Peripheral Pulse Rate 80 bpm    Systolic Blood Pressure 128 mmHg    Diastolic Blood Pressure 70 mmHg    Mean Arterial Pressure 89 mmHg      Measurements from flowsheet : Measurements   8/6/2019 3:40 PM CDT     Weight Measured - Standard                179.6 lb     General:  Alert and oriented, No acute distress.    HENT:  No pharyngeal erythema.    Neck:  No lymphadenopathy.    Respiratory:  Lungs are clear to auscultation.    Cardiovascular:  Normal rate, Regular rhythm.    Gastrointestinal:  Soft, Non-tender, Non-distended.    Musculoskeletal:  Normal gait.    Neurologic:  No focal deficits.    Psychiatric:  Appropriate mood & affect.    Normal testes. Erythema on lateral coronal penis      Review / Management   CT abdomen: February 2019  CONCLUSION:   1.  Prostatic enlargement with nodular impression on the base of the bladder.  2.  Small bilateral fat-containing inguinal hernias.  3.  No acute inflammatory process or adenopathy in the abdomen or pelvis.    Talked with Radiologist and unlikely mesenteric ischemia even though not CTA on review of CT abdomen         Impression and Plan   Diagnosis     Anxiety (XWD30-QU F41.9).     Abdominal pain (DCI83-BU R10.9).     Balanitis (MPL89-QO N48.1).     Pain in left testicle (YLM18-EQ N50.812).       Prostate enlargement: nodular impression on the bladder and Urologist felt benign. Did not take flomax  Anxiety: feel that is causing his abdominal pain and recommend zoloft. Recommend counseling. Discussed possible GERD and omeprazole.  Abdominal pain: feel likely anxiety but wants some testing to rule out other things. Patient desires EGD (discussed risks and benefits). Consider MRCP as well.  Left testicle pain: no abnormalities on exam. but patient concerned and would like ultrasound  Balanitis: start Lamisil

## 2022-02-16 NOTE — LETTER
(Inserted Image. Unable to display)   February 06, 2020      DEANDRE WILKINSON    [facility] [id number]        Dear DEANDRE,      Thank you for selecting [facility] for your healthcare needs.     Stress Test findings      FINAL CONCLUSIONS      1. Normal stress echocardiogram without evidence of stress-induced ischemia.      2. Excellent exercise tolerance, achieving 12.1 METs, 95.5% of max predicted heart rate, 149% of age predicted exercise capacity.      3. No diagnostic ECG evidence of ischemia.      4. Normal blood pressure response to exercise      5. Mild right ventricular chamber enlargement is incidentally noted. May consider complete TTE to evaluate further.             Please contact me or my assistant at [phone] if you have any questions or concerns.     Sincerely,        Alejandro Funes MD

## 2022-02-16 NOTE — PROGRESS NOTES
Patient:   YFN WILKINSON            MRN: [id number]            FIN: [id number]               Age:   64 years     Sex:  Male     :  1955   Associated Diagnoses:   Annual exam; Diabetes mellitus screening; Lipid screening; Abdominal pain   Author:   Yfn Funes MD      Chief Complaint   2019 2:49 PM CST    Yearly px      Well Adult History   Chronic tinnitus bilateral. Increased since not using ear protection. Attributes hearing loss left ear to environmental hearing exposure.  Working two jobs.    Upset stomach for the past 6 weeks. Less when sitting and resting, increased with movement. Sleeping through the night. Appetite normal. No signicant weight loss.       Review of Systems   Constitutional:  No fever, No weakness, No fatigue.    Eye:  No recent visual problem.    Ear/Nose/Mouth/Throat:  No ear pain.    Respiratory:  No shortness of breath, No cough, No wheezing.    Cardiovascular:  No chest pain, No peripheral edema.    Gastrointestinal:  No nausea, No vomiting, No diarrhea.    Genitourinary:  No dysuria, No change in urine stream.    Hematology/Lymphatics:  No bruising tendency, No bleeding tendency.    Endocrine:  Negative.    Immunologic:  Negative.    Musculoskeletal:  No joint pain, No decreased range of motion.    Integumentary:  No rash.    Neurologic:  No numbness, No headache.    Genitourinary: no problems with erections or intercourse. nocturia sometimes 1-2x. good stream and no frequency   All other systems reviewed and negative   PHQ-9: 1pt (2019). 2pts (2017)  CAGE: no alcohol      Health Status   Allergies:    Allergic Reactions (Selected)  No known allergies   Medications:  (Selected)      Problem list:    All Problems  HLD (hyperlipidemia) / SNOMED CT 53384978 / Confirmed  Morbid obesity / SNOMED CT 201015733 / Probable  Tinnitus / SNOMED CT 748717580 / Confirmed      Histories   Procedure history:    Colonoscopy (SNOMED CT 394319042) performed by Marin HALL,  Yfn on 2018 at 62 Years.  Comments:  2018 12:35 PM CST - Shruti Lawler  Sedation:  Fentanyl and Versed  Diverticuli:  Sigmoid  F/u in 10 years  Colonoscopy (SNOMED CT 187303795) performed by Yfn Funes MD on 2007 at 52 Years.     Family History: Mom  of Alzheimers age 79. Dad  of CHF age 88. Sisters with hyperlipidemia. Brother and children healthy. Sister diagnosed with Pancreatic cancer age 55  Social History:  (Joan) 1975 going good. Nonsmoker. Minimal alcohol. 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 Hopes to retire . 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 Does not have to worry about year to year stress of crops any more. Has a hobby farm. Has restored tractors in the past      Physical Examination   Vital Signs   2019 2:49 PM CST Temperature Tympanic 97.8 DegF  LOW    Peripheral Pulse Rate 76 bpm    Pulse Site Radial artery    HR Method Manual    Systolic Blood Pressure 116 mmHg    Diastolic Blood Pressure 76 mmHg    Mean Arterial Pressure 89 mmHg    BP Site Right arm    BP Method Manual      Measurements from flowsheet : Measurements   2019 2:49 PM CST Height Measured - Standard 71 in    Weight Measured - Standard 179.6 lb    BSA 2.02 m2    Body Mass Index 25.05 kg/m2  HI      General:  Alert and oriented, No acute distress.    Eye:  Normal conjunctiva.    HENT:  No pharyngeal erythema, normal TM's after irrigation performed.    Neck:  No lymphadenopathy, No thyromegaly.    Respiratory:  Lungs are clear to auscultation.    Cardiovascular:  Normal rate, Regular rhythm, No murmur, No edema.    Gastrointestinal:  Soft, Non-tender, Non-distended.    Genitourinary:  Normal genitalia for age and sex, No scrotal tenderness, No inguinal tenderness.    Musculoskeletal:  Normal range of motion, Normal strength, No tenderness, Normal gait.    Integumentary:  Warm, No rash.    Neurologic:  Alert,  Oriented, Normal sensory, Normal motor function, No focal deficits, Normal deep tendon reflexes.    Psychiatric:  Cooperative, Appropriate mood & affect.       Impression and Plan   Diagnosis     Annual exam (QVC39-TN Z00.00).     Diabetes mellitus screening (MWC11-DR Z13.1).     Lipid screening (HQT43-UR Z13.220).     Abdominal pain (STV65-FL R10.9).       Cardiac: risk of MI over 10 years is 11.6% 2017 numbers and discussed options of statin and Cardiac calcium score.  Colon Cancer Screen: Normal screening colonoscopy December 2007. Average risk and normal colonoscopy January 2018. Consider repeat 2028  Prostate Cancer Screen: Discussed and declines  Tinnitus: will monitor.   Hearing screen: Failed 4000Hz on the right. Hearing improved after cerumen removed with irrigation (had failed 4000Hz, 2000Hz and 1000Hz before cerumen removal)  Immunizations: Adacel 2007 and 2017. Zostavax discussed and declines  Abdominal pain: CT abdomen and pelvix with IV and oral contrast. Follow up when finished

## 2022-02-16 NOTE — LETTER
(Inserted Image. Unable to display)   September 01, 2019      DEANDREYUE WILKINSON    [facility] [id number]        Dear DEANDRE,      Thank you for selecting [facility] for your healthcare needs.     Radiologist report for MRI of the pancreas    IMPRESSION:  1.  Normal pancreas. No mass or cyst.  2.  Normal MRCP. No abnormalities of the bile ducts or gallbladder.  3.  Small inferior right hepatic lobe cyst as well as multiple bilateral renal  cysts.          Please contact me or my assistant at [phone] if you have any questions or concerns.     Sincerely,        Alejandro Funes MD

## 2022-02-16 NOTE — NURSING NOTE
Generalized Anxiety Disorder Screening Entered On:  4/12/2019 2:42 PM CDT    Performed On:  4/12/2019 2:42 PM CDT by Tara Hanson CMA               Generalized Anxiety Disorder Screening   BUSHRA Nervous, Anxious On Edge :   Several days   BUSHRA Control Worrying B :   More than half the days   BUSHRA Worrying Too Much :   More than half the days   BUSHRA Restless :   Not at all   BUSHRA Easily Annoyed/Irritable :   Not at all   BUSHRA Afraid :   Nearly every day   BUSHRA Trouble Relaxing :   Not at all   BUSHRA Total Screening Score :   8    BUSHRA Difficulty with Work, Home, Others :   Somewhat difficult   Tara Hanson CMA - 4/12/2019 2:42 PM CDT

## 2022-02-16 NOTE — NURSING NOTE
Comprehensive Intake Entered On:  2/11/2019 2:55 PM CST    Performed On:  2/11/2019 2:49 PM CST by Selam Partida CMA               Summary   Chief Complaint :   Yearly px   Weight Measured :   179.6 lb(Converted to: 179 lb 10 oz, 81.47 kg)    Height Measured :   71 in(Converted to: 5 ft 11 in, 180.34 cm)    Body Mass Index :   25.05 kg/m2 (HI)    Body Surface Area :   2.02 m2   Systolic Blood Pressure :   116 mmHg   Diastolic Blood Pressure :   76 mmHg   Mean Arterial Pressure :   89 mmHg   Peripheral Pulse Rate :   76 bpm   BP Site :   Right arm   Pulse Site :   Radial artery   BP Method :   Manual   HR Method :   Manual   Temperature Tympanic :   97.8 DegF(Converted to: 36.6 DegC)  (LOW)    Race :      Languages :   English   Ethnicity :   Not  or    Selam Partida CMA - 2/11/2019 2:49 PM CST   Health Status   Allergies Verified? :   Yes   Medication History Verified? :   Yes   Medical History Verified? :   No   Pre-Visit Planning Status :   Completed   Tobacco Use? :   Never smoker   Selam Partida CMA - 2/11/2019 2:49 PM CST   Demographics   Last Name :   MINH   Address [address]   First Name :   DEANDRE   City :   [place]   State :   [place]   Zip Code :   [id number]   Selam Partida CMA - 2/11/2019 2:49 PM CST   Consents   Consent for Immunization Exchange :   Consent Granted   Consent for Immunizations to Providers :   Consent Granted   Selam Partida CMA - 2/11/2019 2:49 PM CST   Meds / Allergies   (As Of: 2/11/2019 2:55:24 PM CST)   Allergies (Active)   No known allergies  Estimated Onset Date:   Unspecified ; Created By:   Marie Colón CMA; Reaction Status:   Active ; Category:   Drug ; Substance:   No known allergies ; Type:   Allergy ; Updated By:   Marie Colón CMA; Reviewed Date:   2/11/2019 2:52 PM CST        Medication List   (As Of: 2/11/2019 2:55:24 PM CST)

## 2022-02-16 NOTE — NURSING NOTE
Comprehensive Intake Entered On:  12/3/2019 4:27 PM CST    Performed On:  12/3/2019 4:24 PM CST by Tara Hanson CMA               Summary   Chief Complaint :   Preoperative exam: DOS 12/12/2019 for right cataract w/ Dr Bower @ [facility]   Weight Measured :   175 lb(Converted to: 175 lb 0 oz, 79.38 kg)    Height Measured :   71 in(Converted to: 5 ft 11 in, 180.34 cm)    Body Mass Index :   24.4 kg/m2   Body Surface Area :   1.99 m2   Systolic Blood Pressure :   126 mmHg   Diastolic Blood Pressure :   70 mmHg   Mean Arterial Pressure :   89 mmHg   Peripheral Pulse Rate :   80 bpm   BP Site :   Right arm   BP Method :   Manual   Oxygen Saturation :   96 %   Race :      Languages :   English   Ethnicity :   Not  or    Tara Hanson CMA - 12/3/2019 4:24 PM CST   Health Status   Allergies Verified? :   Yes   Medication History Verified? :   Yes   Medical History Verified? :   Yes   Pre-Visit Planning Status :   Completed   Tobacco Use? :   Never smoker   Tara Hanson CMA - 12/3/2019 4:24 PM CST   Consents   Consent for Immunization Exchange :   Consent Granted   Consent for Immunizations to Providers :   Consent Granted   Tara Hanson CMA - 12/3/2019 4:24 PM CST   Meds / Allergies   (As Of: 12/3/2019 4:27:14 PM CST)   Allergies (Active)   No Known Medication Allergies  Estimated Onset Date:   Unspecified ; Created By:   Priti Tipton CMA; Reaction Status:   Active ; Category:   Drug ; Substance:   No Known Medication Allergies ; Type:   Allergy ; Updated By:   Priti Tipton CMA; Reviewed Date:   12/3/2019 4:25 PM CST        Medication List   (As Of: 12/3/2019 4:27:14 PM CST)   No Known Home Medications     Tara Hanson CMA - 12/3/2019 4:25:48 PM

## 2022-02-16 NOTE — PROGRESS NOTES
Patient:   YFN WILKINSON            MRN: [id number]            FIN: [id number]               Age:   62 years     Sex:  Male     :  1955   Associated Diagnoses:   Annual exam; Diabetes mellitus screening; Lipid screening; Benign positional vertigo   Author:   Yfn Funes MD      Chief Complaint   2017 3:47 PM CDT    Here for annual exam        Well Adult History   Chronic tinnitus bilateral. Increased since not using ear protection. Attributes hearing loss left ear to environmental hearing exposure.  Feeling slightly dizzy for the past couple weeks. Able to keep working. Two weeks ago felt a little dizzy after taking a nap. Working two jobs.       Review of Systems   Constitutional:  No fever, No weakness, No fatigue.    Eye:  No recent visual problem.    Ear/Nose/Mouth/Throat:  No ear pain.    Respiratory:  No shortness of breath, No cough, No wheezing.    Cardiovascular:  No chest pain, No peripheral edema.    Gastrointestinal:  No nausea, No vomiting, No diarrhea.    Genitourinary:  No dysuria, No change in urine stream.    Hematology/Lymphatics:  No bruising tendency, No bleeding tendency.    Endocrine:  Negative.    Immunologic:  Negative.    Musculoskeletal:  No joint pain, No decreased range of motion.    Integumentary:  No rash.    Neurologic:  No numbness, No headache.    Genitourinary: no problems with erections or intercourse. nocturia sometimes 1-2x. good stream and no frequency   All other systems reviewed and negative   PHQ-9: 2pts (2017)  CAGE: no alcohol      Health Status   Allergies:    Allergic Reactions (Selected)  No known allergies   Medications:  (Selected)      Problem list:    All Problems  HLD (Hyperlipidemia) / ICD-9-.4 / Confirmed  Morbid obesity / SNOMED CT 945764245 / Probable  Tinnitus / ICD-9-.30 / Confirmed      Histories   Family History: Mom  of Alzheimers age 79. Dad  of CHF age 88. Sisters with hyperlipidemia. Brother and children healthy    Procedure history:    Colonoscopy (SNOMED CT 774199709) performed by Yfn Funes MD on 12/26/2007 at 52 Years.   Social History: Social history negative,  (Joan) 1975 going good. Nonsmoker. Minimal alcohol. 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 Hopes to retire 2020. 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 Does not have to worry about year to year stress of crops any more. Has a hobby farm. Has restored tractors in the past      Physical Examination   Vital Signs   8/31/2017 3:47 PM CDT Temperature Tympanic 97.1 DegF  LOW    Peripheral Pulse Rate 86 bpm    Systolic Blood Pressure 123 mmHg    Diastolic Blood Pressure 86 mmHg    Mean Arterial Pressure 98 mmHg      Measurements from flowsheet : Measurements   8/31/2017 3:47 PM CDT Height Measured - Standard 71 in    Weight Measured - Standard 173.2 lb    BSA 1.98 m2    Body Mass Index 24.15 kg/m2      General:  Alert and oriented, No acute distress.    Eye:  Normal conjunctiva.    HENT:  No pharyngeal erythema, normal TM's after irrigation performed.    Neck:  No lymphadenopathy, No thyromegaly.    Respiratory:  Lungs are clear to auscultation.    Cardiovascular:  Normal rate, Regular rhythm, No murmur, No edema.    Gastrointestinal:  Soft, Non-tender, Non-distended.    Genitourinary:  Normal genitalia for age and sex, No scrotal tenderness, No inguinal tenderness.    Musculoskeletal:  Normal range of motion, Normal strength, No tenderness, Normal gait.    Integumentary:  Warm, No rash.    Neurologic:  Alert, Oriented, Normal sensory, Normal motor function, No focal deficits, Normal deep tendon reflexes.    Psychiatric:  Cooperative, Appropriate mood & affect.    Neurologic: Dix-Hallpike-Epley maneuver mildly positive      Impression and Plan   Diagnosis     Annual exam (VKD87-DD Z00.00).     Diabetes mellitus screening (XUL33-ZX Z13.1).     Benign positional vertigo (HDM34-MA H81.10).      Lipid screening (FRM80-AB Z13.220).       Cardiac: risk of MI over 10 years is 6.7% (6.9%) 2015 numbers  Colon Cancer Screen: Normal screening colonoscopy December 2007. Average risk but would like to repeat colonoscopy January 2018  Prostate Cancer Screen: Discussed and declines  Tinnitus: will monitor.   Hearing screen: Failed 4000Hz bilateral. Hearing improved after cerumen removed with irrigation  Immunizations: Adacel 2007 and repeat. Zostavax discussed  BPPV: discussed instructions and natural history. Follow up if not improving

## 2022-02-16 NOTE — LETTER
(Inserted Image. Unable to display)   February 19, 2019      DEANDRE WILKINSON    [employer] [id number]        Dear DEANDRE,      Thank you for selecting [facility] for your healthcare needs.     Radiology report as follows:    CONCLUSION:   1.  Prostatic enlargement with nodular impression on the base of the bladder.  2.  Small bilateral fat-containing inguinal hernias.  3.  No acute inflammatory process or adenopathy in the abdomen or pelvis.    Follow up in clinic to discuss          Please contact me or my assistant at [phone] if you have any questions or concerns.     Sincerely,        Alejandro Funes MD

## 2022-02-16 NOTE — PROGRESS NOTES
Patient:   YFN WILKINSON            MRN: [id number]            FIN: [id number]               Age:   64 years     Sex:  Male     :  1955   Associated Diagnoses:   Prostate enlargement; Anxiety   Author:   Yfn Funes MD      Visit Information      Date of Service: 2019 02:53 pm  Performing Location: [facility]  Encounter#: [id number]      Primary Care Provider (PCP):  Yfn Funes MD    NPI# [id number]      Referring Provider:  Yfn Funes MD    NPI# [id number]      Chief Complaint   2019 3:07 PM CDT    c/o abdominal pain still not better      History of Present Illness   Feels lousy everyday. Does also feel good at times during the day. Has a mountain dew once a week and feels great after it for an hour. Gives him a boost and stomach feels good.  Upset stomach for the past 4 months. Less when sitting and resting, increased with movement. Sleeping through the night. Appetite normal. No significant weight loss. Feels in the middle of the abdomen. Does not notice it when focused on something. Eating makes him feel better.  Symptoms started when heard the new about sister.   Son in [facility] for failure to pay child support. He is addicted to drugs. Son lives with him. It is a big stressor.  One daughter in medical field and other daughter is a teacher.  Denies diarrhea and constipation.   Sometimes feels like someone took his energy. Things pass at times.  Sister and brother with significant anxiety      Review of Systems   Constitutional:  No fever.    Gastrointestinal:  No vomiting.    Nocturia 1-2x at night  PHQ-9: 6 pts (2019). 6pts (2019)  MDQ: 1pt moderate problem (2019)  BUSHRA-7: 8pts (2019). 17pts (2019)      Health Status   Allergies:    Allergic Reactions (Selected)  No known allergies   Medications:  (Selected)      Problem list:    All Problems  HLD (hyperlipidemia) / SNOMED CT 99950407 / Confirmed  Morbid obesity / SNOMED CT 140721127  / Probable  Tinnitus / SNOMED CT 311636241 / Confirmed      Histories   Procedure history:    Colonoscopy (SNOMED CT 321069504) performed by Yfn Funes MD on 2018 at 62 Years.  Comments:  2018 12:35 PM CST - Shruti Lawler  Sedation:  Fentanyl and Versed  Diverticuli:  Sigmoid  F/u in 10 years  Colonoscopy (SNOMED CT 299174413) performed by Yfn Funes MD on 2007 at 52 Years.     Family History: Mom  of Alzheimers age 79. Dad  of CHF age 88. Sisters with hyperlipidemia. Brother and children healthy. Sister diagnosed with Pancreatic cancer age 55  Social History:  (Joan) 1975 going good. Nonsmoker. Minimal alcohol. 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 Hopes to retire . 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 Does not have to worry about year to year stress of crops any more. Has a hobby farm. Has restored tractors in the past      Physical Examination   Vital Signs   2019 3:07 PM CDT Temperature Tympanic 97.6 DegF  LOW    Peripheral Pulse Rate 74 bpm    Pulse Site Radial artery    HR Method Manual    Systolic Blood Pressure 130 mmHg    Diastolic Blood Pressure 80 mmHg    Mean Arterial Pressure 97 mmHg    BP Site Right arm    BP Method Manual    Oxygen Saturation 98 %      Measurements from flowsheet : Measurements   2019 3:07 PM CDT Height Measured - Standard 71 in    Weight Measured - Standard 180.2 lb    BSA 2.02 m2    Body Mass Index 25.13 kg/m2  HI      General:  Alert and oriented, No acute distress.    Respiratory:  Lungs are clear to auscultation.    Cardiovascular:  Normal rate, Regular rhythm.    Gastrointestinal:  Soft, Non-tender, Non-distended.    Musculoskeletal:  Normal gait.    Neurologic:  No focal deficits.    Psychiatric:  Appropriate mood & affect.       Review / Management   CT abdomen: 2019  CONCLUSION:   1.  Prostatic enlargement with nodular impression on the  base of the bladder.  2.  Small bilateral fat-containing inguinal hernias.  3.  No acute inflammatory process or adenopathy in the abdomen or pelvis.      Impression and Plan   Diagnosis     Prostate enlargement (ASG22-OI N40.0).     Anxiety (MTN81-SM F41.9).       Prostate enlargement: nodular impression on the bladder and Urologist felt benign. Did not take flomax  Anxiety: feel that is causing his abdominal pain and start zoloft. Recommend counseling. Discussed possible GERD and start omeprazole. Follow up in 3 weeks  Also has a General surgery consult in 2 weeks. Discussed EGD but hold off for now    Addendum 4/12: Left message stating could pursue MRI abdomen and EGD to reassure him nothing is missed

## 2023-09-20 NOTE — LETTER
(Inserted Image. Unable to display)       March 07, 2019Re:  DEANDRE WILKINSONDOB:  1955Gangelica Iqbal M.D.[address] [facility] [place] [address]  Dr. Iqbal,The following patient has been referred to your office/practice:  DEANDRE WILKINSON Appointment is scheduled for March 11, 2019 at 2:15 p.m. at the [place] office. Please note that Dr. Olson's 3/4/19 note is unavailable at the time of this release. Please refer to the attached clinical documentation for a summary of DEANDRE's care.  Please do not hesitate to contact our office if any additional clinical questions arise. All relevant records and transition of care documents should be mailed or faxed. Your assistance in providing continuity of care is appreciated. Sincerely, [facility] [address](P) [phone](F) [phone]   PGY-1 Progress Note discussed with attending    PAGER #: [[phone]] TILL 5:00 PM  PLEASE CONTACT ON CALL TEAM:  - On Call Team (Please refer to Saskia) FROM 5:00 PM - 8:30PM  - Nightfloat Team FROM 8:30 -7:30 AM    CC: Patient is a 89y old  Male who presents with a chief complaint of Hypoxia, SOB (19 Sep 2023 22:45)      OVERNIGHT EVENTS:    SUBJECTIVE / INTERVAL HPI: Patient seen and examined at bedside.    ROS:  CONSTITUTIONAL: No weakness, fevers or chills  EYES/ENT: No visual changes;  No vertigo or throat pain   NECK: No pain or stiffness  RESPIRATORY: No cough, wheezing, hemoptysis; No shortness of breath  CARDIOVASCULAR: No chest pain or palpitations  GASTROINTESTINAL: No abdominal or epigastric pain. No nausea, vomiting, or hematemesis; No diarrhea or constipation. No melena or hematochezia.  GENITOURINARY: No dysuria, frequency or hematuria  NEUROLOGICAL: No numbness or weakness  SKIN: No itching, burning, rashes, or lesions     VITAL SIGNS:  Vital Signs Last 24 Hrs  T(C): 36.4 (20 Sep 2023 11:15), Max: 36.5 (20 Sep 2023 07:20)  T(F): 97.5 (20 Sep 2023 11:15), Max: 97.7 (20 Sep 2023 07:20)  HR: 81 (20 Sep 2023 11:15) (41 - 86)  BP: 120/72 (20 Sep 2023 11:15) (107/52 - 124/56)  BP(mean): 75 (20 Sep 2023 04:12) (75 - 75)  RR: 18 (20 Sep 2023 11:15) (18 - 18)  SpO2: 98% (20 Sep 2023 11:15) (95% - 100%)    Parameters below as of 20 Sep 2023 11:15  Patient On (Oxygen Delivery Method): room air        PHYSICAL EXAM:    General: underweight, elderly  HEENT: hard of hearing, NC/AT; PERRL, anicteric sclera; MMM  Neck: supple  Cardiovascular: +S1/S2; loud systolic murmur at LUSB radiating to R carotid  Respiratory: CTA B/L; no W/R/R  Gastrointestinal: soft, NT/ND; +BSx4  Extremities: WWP; no edema, clubbing or cyanosis  Vascular: 2+ radial, DP/PT pulses B/L  Skin: Warm, dry, reduced turgor, no rashes, or ecchymoses  Neurological: AAOx3; no focal deficits    MEDICATIONS:  MEDICATIONS  (STANDING):  aspirin enteric coated 81 milliGRAM(s) Oral daily  atorvastatin 20 milliGRAM(s) Oral at bedtime  clopidogrel Tablet 75 milliGRAM(s) Oral daily  dexAMETHasone     Tablet 6 milliGRAM(s) Oral daily  enoxaparin Injectable 30 milliGRAM(s) SubCutaneous every 24 hours  guaiFENesin  milliGRAM(s) Oral every 12 hours  influenza  Vaccine (HIGH DOSE) 0.7 milliLiter(s) IntraMuscular once  insulin lispro (ADMELOG) corrective regimen sliding scale   SubCutaneous Before meals and at bedtime  losartan 100 milliGRAM(s) Oral daily  metoprolol tartrate 25 milliGRAM(s) Oral two times a day  remdesivir  IVPB   IV Intermittent     MEDICATIONS  (PRN):  acetaminophen     Tablet .. 650 milliGRAM(s) Oral every 6 hours PRN Temp greater or equal to 38C (100.4F), Mild Pain (1 - 3)  benzonatate 100 milliGRAM(s) Oral three times a day PRN Cough  guaifenesin/dextromethorphan Oral Liquid 10 milliLiter(s) Oral every 4 hours PRN Cough  melatonin 3 milliGRAM(s) Oral at bedtime PRN Insomnia  ondansetron Injectable 4 milliGRAM(s) IV Push every 8 hours PRN Nausea and/or Vomiting      ALLERGIES:  Allergies    penicillin (Rash)    Intolerances        LABS:                        10.7   6.23  )-----------( 154      ( 20 Sep 2023 05:27 )             32.8     09-20    141  |  105  |  48<H>  ----------------------------<  115<H>  3.9   |  27  |  1.18    Ca    8.0<L>      20 Sep 2023 05:27  Phos  3.8     09-20  Mg     2.4     09-20    TPro  6.1  /  Alb  2.5<L>  /  TBili  0.4  /  DBili  x   /  AST  36  /  ALT  31  /  AlkPhos  57  09-20      Urinalysis Basic - ( 20 Sep 2023 05:27 )    Color: x / Appearance: x / SG: x / pH: x  Gluc: 115 mg/dL / Ketone: x  / Bili: x / Urobili: x   Blood: x / Protein: x / Nitrite: x   Leuk Esterase: x / RBC: x / WBC x   Sq Epi: x / Non Sq Epi: x / Bacteria: x      CAPILLARY BLOOD GLUCOSE      POCT Blood Glucose.: 185 mg/dL (20 Sep 2023 11:32)      RADIOLOGY & ADDITIONAL TESTS:   < from: Xray Chest 1 View- PORTABLE-Routine (09.18.23 @ 18:53) >  INTERPRETATION:  INDICATION: Hypoxia, cough and    Portable chest 6:45 PM    COMPARISON: 3/11/2021    FINDINGS:  Heart/Vascular: The heart size, mediastinum, hilum and aorta are within   normal limits for projection. Calcified aortic arch. Coronary artery   stent.  Pulmonary: Midline trachea. There is no focal infiltrate, congestion or   effusion. Elevated right diaphragm.  Bones: There is no fracture. Advanced degenerative change left   glenohumeral joint, moderate degenerative change right shoulder with   calcifications within the rotator cuff interval  Lines and catheter: None    Impression:    No acute pulmonarydisease.    < end of copied text >   PGY-1 Progress Note discussed with attending    PAGER #: [[phone]] TILL 5:00 PM  PLEASE CONTACT ON CALL TEAM:  - On Call Team (Please refer to Saskia) FROM 5:00 PM - 8:30PM  - Nightfloat Team FROM 8:30 -7:30 AM    CC: Patient is a 89y old  Male who presents with a chief complaint of Hypoxia, SOB (19 Sep 2023 22:45)      OVERNIGHT EVENTS: admit to 5N    SUBJECTIVE / INTERVAL HPI: Patient seen and examined at bedside. Says he is feeling much better today. Endorses poor PO hydration and decreased urination. Still has cough but improved. Denies fever, chills, headache, dizziness, chest pain, palpitations, shortness of breath, abdominal pain, nausea, vomiting, diarrhea, constipation, and dysuria.      ROS:  CONSTITUTIONAL: No weakness, fevers or chills  EYES/ENT: No visual changes;  No vertigo or throat pain   NECK: No pain or stiffness  RESPIRATORY: +cough, wheezing, hemoptysis; No shortness of breath  CARDIOVASCULAR: No chest pain or palpitations  GASTROINTESTINAL: No abdominal or epigastric pain. No nausea, vomiting, or hematemesis; No diarrhea or constipation. No melena or hematochezia.  GENITOURINARY: No dysuria, frequency or hematuria  NEUROLOGICAL: No numbness or weakness  SKIN: No itching, burning, rashes, or lesions     VITAL SIGNS:  Vital Signs Last 24 Hrs  T(C): 36.4 (20 Sep 2023 11:15), Max: 36.5 (20 Sep 2023 07:20)  T(F): 97.5 (20 Sep 2023 11:15), Max: 97.7 (20 Sep 2023 07:20)  HR: 81 (20 Sep 2023 11:15) (41 - 86)  BP: 120/72 (20 Sep 2023 11:15) (107/52 - 124/56)  BP(mean): 75 (20 Sep 2023 04:12) (75 - 75)  RR: 18 (20 Sep 2023 11:15) (18 - 18)  SpO2: 98% (20 Sep 2023 11:15) (95% - 100%)    Parameters below as of 20 Sep 2023 11:15  Patient On (Oxygen Delivery Method): room air        PHYSICAL EXAM:    General: underweight, elderly  HEENT: hard of hearing, NC/AT; PERRL, anicteric sclera; MMM  Neck: supple  Cardiovascular: +S1/S2; loud systolic murmur at LUSB radiating to R carotid  Respiratory: CTA B/L; no W/R/R  Gastrointestinal: soft, NT/ND; +BSx4  Extremities: WWP; no edema, clubbing or cyanosis  Vascular: 2+ radial, DP/PT pulses B/L  Skin: Warm, dry, reduced turgor, no rashes, or ecchymoses  Neurological: AAOx3; no focal deficits    MEDICATIONS:  MEDICATIONS  (STANDING):  aspirin enteric coated 81 milliGRAM(s) Oral daily  atorvastatin 20 milliGRAM(s) Oral at bedtime  clopidogrel Tablet 75 milliGRAM(s) Oral daily  dexAMETHasone     Tablet 6 milliGRAM(s) Oral daily  enoxaparin Injectable 30 milliGRAM(s) SubCutaneous every 24 hours  guaiFENesin  milliGRAM(s) Oral every 12 hours  influenza  Vaccine (HIGH DOSE) 0.7 milliLiter(s) IntraMuscular once  insulin lispro (ADMELOG) corrective regimen sliding scale   SubCutaneous Before meals and at bedtime  losartan 100 milliGRAM(s) Oral daily  metoprolol tartrate 25 milliGRAM(s) Oral two times a day  remdesivir  IVPB   IV Intermittent     MEDICATIONS  (PRN):  acetaminophen     Tablet .. 650 milliGRAM(s) Oral every 6 hours PRN Temp greater or equal to 38C (100.4F), Mild Pain (1 - 3)  benzonatate 100 milliGRAM(s) Oral three times a day PRN Cough  guaifenesin/dextromethorphan Oral Liquid 10 milliLiter(s) Oral every 4 hours PRN Cough  melatonin 3 milliGRAM(s) Oral at bedtime PRN Insomnia  ondansetron Injectable 4 milliGRAM(s) IV Push every 8 hours PRN Nausea and/or Vomiting      ALLERGIES:  Allergies    penicillin (Rash)    Intolerances        LABS:                        10.7   6.23  )-----------( 154      ( 20 Sep 2023 05:27 )             32.8     09-20    141  |  105  |  48<H>  ----------------------------<  115<H>  3.9   |  27  |  1.18    Ca    8.0<L>      20 Sep 2023 05:27  Phos  3.8     09-20  Mg     2.4     09-20    TPro  6.1  /  Alb  2.5<L>  /  TBili  0.4  /  DBili  x   /  AST  36  /  ALT  31  /  AlkPhos  57  09-20      Urinalysis Basic - ( 20 Sep 2023 05:27 )    Color: x / Appearance: x / SG: x / pH: x  Gluc: 115 mg/dL / Ketone: x  / Bili: x / Urobili: x   Blood: x / Protein: x / Nitrite: x   Leuk Esterase: x / RBC: x / WBC x   Sq Epi: x / Non Sq Epi: x / Bacteria: x      CAPILLARY BLOOD GLUCOSE      POCT Blood Glucose.: 185 mg/dL (20 Sep 2023 11:32)      RADIOLOGY & ADDITIONAL TESTS:   < from: Xray Chest 1 View- PORTABLE-Routine (09.18.23 @ 18:53) >  INTERPRETATION:  INDICATION: Hypoxia, cough and    Portable chest 6:45 PM    COMPARISON: 3/11/2021    FINDINGS:  Heart/Vascular: The heart size, mediastinum, hilum and aorta are within   normal limits for projection. Calcified aortic arch. Coronary artery   stent.  Pulmonary: Midline trachea. There is no focal infiltrate, congestion or   effusion. Elevated right diaphragm.  Bones: There is no fracture. Advanced degenerative change left   glenohumeral joint, moderate degenerative change right shoulder with   calcifications within the rotator cuff interval  Lines and catheter: None    Impression:    No acute pulmonarydisease.    < end of copied text >   PGY-1 Progress Note discussed with attending    PAGER #: [[phone]] TILL 5:00 PM  PLEASE CONTACT ON CALL TEAM:  - On Call Team (Please refer to Saskia) FROM 5:00 PM - 8:30PM  - Nightfloat Team FROM 8:30 -7:30 AM    CC: Patient is a 89y old  Male who presents with a chief complaint of Hypoxia, SOB (19 Sep 2023 22:45)        SUBJECTIVE / INTERVAL HPI: Patient seen and examined at bedside. Says he is feeling much better today. Endorses poor PO hydration and decreased urination. Still has cough but improved. Denies fever, chills, headache, dizziness, chest pain, palpitations, shortness of breath, abdominal pain, nausea, vomiting, diarrhea, constipation, and dysuria.      ROS:  CONSTITUTIONAL: No weakness, fevers or chills  EYES/ENT: No visual changes;  No vertigo or throat pain   NECK: No pain or stiffness  RESPIRATORY: +cough, wheezing, hemoptysis; No shortness of breath  CARDIOVASCULAR: No chest pain or palpitations  GASTROINTESTINAL: No abdominal or epigastric pain. No nausea, vomiting, or hematemesis; No diarrhea or constipation. No melena or hematochezia.  GENITOURINARY: No dysuria, frequency or hematuria  NEUROLOGICAL: No numbness or weakness  SKIN: No itching, burning, rashes, or lesions     VITAL SIGNS:  Vital Signs Last 24 Hrs  T(C): 36.4 (20 Sep 2023 11:15), Max: 36.5 (20 Sep 2023 07:20)  T(F): 97.5 (20 Sep 2023 11:15), Max: 97.7 (20 Sep 2023 07:20)  HR: 81 (20 Sep 2023 11:15) (41 - 86)  BP: 120/72 (20 Sep 2023 11:15) (107/52 - 124/56)  BP(mean): 75 (20 Sep 2023 04:12) (75 - 75)  RR: 18 (20 Sep 2023 11:15) (18 - 18)  SpO2: 98% (20 Sep 2023 11:15) (95% - 100%)    Parameters below as of 20 Sep 2023 11:15  Patient On (Oxygen Delivery Method): room air        PHYSICAL EXAM:    General: underweight, elderly  HEENT: hard of hearing, NC/AT; PERRL, anicteric sclera; MMM  Neck: supple  Cardiovascular: +S1/S2; loud systolic murmur at LUSB radiating to R carotid  Respiratory: CTA B/L; no W/R/R  Gastrointestinal: soft, NT/ND; +BSx4  Extremities: WWP; no edema, clubbing or cyanosis  Vascular: 2+ radial, DP/PT pulses B/L  Skin: Warm, dry, reduced turgor, no rashes, or ecchymoses  Neurological: AAOx3; no focal deficits    MEDICATIONS:  MEDICATIONS  (STANDING):  aspirin enteric coated 81 milliGRAM(s) Oral daily  atorvastatin 20 milliGRAM(s) Oral at bedtime  clopidogrel Tablet 75 milliGRAM(s) Oral daily  dexAMETHasone     Tablet 6 milliGRAM(s) Oral daily  enoxaparin Injectable 30 milliGRAM(s) SubCutaneous every 24 hours  guaiFENesin  milliGRAM(s) Oral every 12 hours  influenza  Vaccine (HIGH DOSE) 0.7 milliLiter(s) IntraMuscular once  insulin lispro (ADMELOG) corrective regimen sliding scale   SubCutaneous Before meals and at bedtime  losartan 100 milliGRAM(s) Oral daily  metoprolol tartrate 25 milliGRAM(s) Oral two times a day  remdesivir  IVPB   IV Intermittent     MEDICATIONS  (PRN):  acetaminophen     Tablet .. 650 milliGRAM(s) Oral every 6 hours PRN Temp greater or equal to 38C (100.4F), Mild Pain (1 - 3)  benzonatate 100 milliGRAM(s) Oral three times a day PRN Cough  guaifenesin/dextromethorphan Oral Liquid 10 milliLiter(s) Oral every 4 hours PRN Cough  melatonin 3 milliGRAM(s) Oral at bedtime PRN Insomnia  ondansetron Injectable 4 milliGRAM(s) IV Push every 8 hours PRN Nausea and/or Vomiting      ALLERGIES:  Allergies    penicillin (Rash)    Intolerances        LABS:                        10.7   6.23  )-----------( 154      ( 20 Sep 2023 05:27 )             32.8     09-20    141  |  105  |  48<H>  ----------------------------<  115<H>  3.9   |  27  |  1.18    Ca    8.0<L>      20 Sep 2023 05:27  Phos  3.8     09-20  Mg     2.4     09-20    TPro  6.1  /  Alb  2.5<L>  /  TBili  0.4  /  DBili  x   /  AST  36  /  ALT  31  /  AlkPhos  57  09-20      Urinalysis Basic - ( 20 Sep 2023 05:27 )    Color: x / Appearance: x / SG: x / pH: x  Gluc: 115 mg/dL / Ketone: x  / Bili: x / Urobili: x   Blood: x / Protein: x / Nitrite: x   Leuk Esterase: x / RBC: x / WBC x   Sq Epi: x / Non Sq Epi: x / Bacteria: x      CAPILLARY BLOOD GLUCOSE      POCT Blood Glucose.: 185 mg/dL (20 Sep 2023 11:32)      RADIOLOGY & ADDITIONAL TESTS:   < from: Xray Chest 1 View- PORTABLE-Routine (09.18.23 @ 18:53) >  INTERPRETATION:  INDICATION: Hypoxia, cough and    Portable chest 6:45 PM    COMPARISON: 3/11/2021    FINDINGS:  Heart/Vascular: The heart size, mediastinum, hilum and aorta are within   normal limits for projection. Calcified aortic arch. Coronary artery   stent.  Pulmonary: Midline trachea. There is no focal infiltrate, congestion or   effusion. Elevated right diaphragm.  Bones: There is no fracture. Advanced degenerative change left   glenohumeral joint, moderate degenerative change right shoulder with   calcifications within the rotator cuff interval  Lines and catheter: None    Impression:    No acute pulmonarydisease.    < end of copied text >

## 2024-01-05 NOTE — LETTER
(Inserted Image. Unable to display)       June 17, 2019      YFN WILKINSON   [address] [id number]          Dear YFN,      Thank you for selecting [facility] for your healthcare needs.     Our records indicate you are due for the following services:     Non-Fasting Labs    If you had your labs done at another facility or with Direct Access Lab Testing at [facility], please bring in a copy of the results to your next visit, mail a copy, or drop off a copy of your results to your Healthcare Provider.    To schedule an appointment or if you have further questions, please contact your primary clinic:   [facility]       [phone]   [facility]       [phone]              [facility]     [phone]    Powered by [employer]    Sincerely,    Yfn Funes MD  
(Inserted Image. Unable to display)   April 12, 2019      YFNYUE WILKINSON       [address] [id number]        Dear YFN,    Thank you for selecting [facility] for your healthcare needs.  Below you will find the results of your recent tests done at our clinic.     Kidney test (creatinine) minimally elevated and not causing the symptoms. Recheck in two months  Rest of labs are good.      Result Name Current Result Reference Range   Sodium Level (mmol/L)  139 4/11/2019 135 - 146   Potassium Level (mmol/L)  5.1 4/11/2019 3.5 - 5.3   Glucose Level (mg/dL)  90 4/11/2019 65 - 99   Creatinine Level (mg/dL) ((H)) 1.30 4/11/2019 0.70 - 1.25   Bilirubin Total (mg/dL)  0.4 4/11/2019 0.2 - 1.2   AST/SGOT (unit/L)  17 4/11/2019 10 - 35   ALT/SGPT (unit/L)  18 4/11/2019 9 - 46   Amylase Level (unit/L)  49 4/11/2019 21 - 101   Lipase Level (unit/L)  29 4/11/2019 7 - 60   TSH (mIU/L)  2.60 4/11/2019 0.40 - 4.50   WBC  6.4 4/11/2019 3.8 - 10.8   Hgb (gm/dL)  16.1 4/11/2019 13.2 - 17.1   Platelet  195 4/11/2019 140 - 400       Please contact me or my assistant at [phone] if you have any questions about your results.     Sincerely,        Yfn Funes MD        What do your labs mean?  Below is a glossary of commonly ordered labs:  LDL   Bad Cholesterol   HDL   Good Cholesterol  AST/ALT   Liver Function   Cr/Creatinine   Kidney Function  Microalbumin   Kidney Function  BUN   Kidney Function  PSA   Prostate    TSH   Thyroid Hormone  HgbA1c   Diabetes Test   Hgb (Hemoglobin)   Red Blood Cells  
(Inserted Image. Unable to display)   February 12, 2020        YFN WILKINSON   [address] [id number]        Dear YFN,      Thank you for selecting [facility] for your healthcare needs.    Our records indicate you are due for the following services:     Annual Physical    To schedule an appointment or if you have further questions, please contact your primary clinic:   [facility]       [phone]   [facility]       [phone]              [facility]     [phone]      Powered by [employer]    Sincerely,    Yfn Funes M.D.  
Provided by Anesthesia Department

## 2025-02-26 ENCOUNTER — TRANSFERRED RECORDS (OUTPATIENT)
Dept: MULTI SPECIALTY CLINIC | Facility: CLINIC | Age: 70
End: 2025-02-26
Payer: COMMERCIAL

## 2025-02-26 LAB
CREATININE (EXTERNAL): 1.18 MG/DL (ref 0.66–1.25)
GFR ESTIMATED (EXTERNAL): 66 ML/MIN/1.73M2
GLUCOSE (EXTERNAL): 117 MG/DL (ref 65–100)
POTASSIUM (EXTERNAL): 4.3 MMOL/L (ref 3.5–5.1)

## 2025-03-10 RX ORDER — CIPROFLOXACIN 500 MG/1
500 TABLET, FILM COATED ORAL 2 TIMES DAILY
Status: ON HOLD | COMMUNITY
End: 2025-03-13

## 2025-03-12 ENCOUNTER — ANESTHESIA (OUTPATIENT)
Dept: SURGERY | Facility: HOSPITAL | Age: 70
End: 2025-03-12
Payer: MEDICARE

## 2025-03-12 ENCOUNTER — HOSPITAL ENCOUNTER (OUTPATIENT)
Facility: HOSPITAL | Age: 70
Discharge: HOME | End: 2025-03-13
Attending: STUDENT IN AN ORGANIZED HEALTH CARE EDUCATION/TRAINING PROGRAM | Admitting: STUDENT IN AN ORGANIZED HEALTH CARE EDUCATION/TRAINING PROGRAM
Payer: MEDICARE

## 2025-03-12 ENCOUNTER — ANESTHESIA EVENT (OUTPATIENT)
Dept: SURGERY | Facility: HOSPITAL | Age: 70
End: 2025-03-12
Payer: MEDICARE

## 2025-03-12 DIAGNOSIS — N40.1 BPH WITH OBSTRUCTION/LOWER URINARY TRACT SYMPTOMS: Primary | ICD-10-CM

## 2025-03-12 DIAGNOSIS — N13.8 BPH WITH OBSTRUCTION/LOWER URINARY TRACT SYMPTOMS: Primary | ICD-10-CM

## 2025-03-12 LAB — GLUCOSE BLDC GLUCOMTR-MCNC: 81 MG/DL (ref 70–99)

## 2025-03-12 PROCEDURE — 250N000013 HC RX MED GY IP 250 OP 250 PS 637: Performed by: STUDENT IN AN ORGANIZED HEALTH CARE EDUCATION/TRAINING PROGRAM

## 2025-03-12 PROCEDURE — 250N000009 HC RX 250

## 2025-03-12 PROCEDURE — 258N000003 HC RX IP 258 OP 636: Performed by: NURSE ANESTHETIST, CERTIFIED REGISTERED

## 2025-03-12 PROCEDURE — 258N000003 HC RX IP 258 OP 636: Performed by: ANESTHESIOLOGY

## 2025-03-12 PROCEDURE — 88307 TISSUE EXAM BY PATHOLOGIST: CPT | Mod: TC | Performed by: STUDENT IN AN ORGANIZED HEALTH CARE EDUCATION/TRAINING PROGRAM

## 2025-03-12 PROCEDURE — 999N000141 HC STATISTIC PRE-PROCEDURE NURSING ASSESSMENT: Performed by: STUDENT IN AN ORGANIZED HEALTH CARE EDUCATION/TRAINING PROGRAM

## 2025-03-12 PROCEDURE — 250N000011 HC RX IP 250 OP 636: Performed by: NURSE ANESTHETIST, CERTIFIED REGISTERED

## 2025-03-12 PROCEDURE — 272N000001 HC OR GENERAL SUPPLY STERILE: Performed by: STUDENT IN AN ORGANIZED HEALTH CARE EDUCATION/TRAINING PROGRAM

## 2025-03-12 PROCEDURE — 250N000011 HC RX IP 250 OP 636

## 2025-03-12 PROCEDURE — 250N000013 HC RX MED GY IP 250 OP 250 PS 637: Performed by: ANESTHESIOLOGY

## 2025-03-12 PROCEDURE — 250N000011 HC RX IP 250 OP 636: Performed by: STUDENT IN AN ORGANIZED HEALTH CARE EDUCATION/TRAINING PROGRAM

## 2025-03-12 PROCEDURE — C1769 GUIDE WIRE: HCPCS | Performed by: STUDENT IN AN ORGANIZED HEALTH CARE EDUCATION/TRAINING PROGRAM

## 2025-03-12 PROCEDURE — 710N000009 HC RECOVERY PHASE 1, LEVEL 1, PER MIN: Performed by: STUDENT IN AN ORGANIZED HEALTH CARE EDUCATION/TRAINING PROGRAM

## 2025-03-12 PROCEDURE — 82962 GLUCOSE BLOOD TEST: CPT

## 2025-03-12 PROCEDURE — 250N000025 HC SEVOFLURANE, PER MIN: Performed by: STUDENT IN AN ORGANIZED HEALTH CARE EDUCATION/TRAINING PROGRAM

## 2025-03-12 PROCEDURE — 370N000017 HC ANESTHESIA TECHNICAL FEE, PER MIN: Performed by: STUDENT IN AN ORGANIZED HEALTH CARE EDUCATION/TRAINING PROGRAM

## 2025-03-12 PROCEDURE — 272N000002 HC OR SUPPLY OTHER OPNP: Performed by: STUDENT IN AN ORGANIZED HEALTH CARE EDUCATION/TRAINING PROGRAM

## 2025-03-12 PROCEDURE — 250N000011 HC RX IP 250 OP 636: Performed by: NURSE PRACTITIONER

## 2025-03-12 PROCEDURE — 360N000077 HC SURGERY LEVEL 4, PER MIN: Performed by: STUDENT IN AN ORGANIZED HEALTH CARE EDUCATION/TRAINING PROGRAM

## 2025-03-12 PROCEDURE — 258N000001 HC RX 258: Performed by: STUDENT IN AN ORGANIZED HEALTH CARE EDUCATION/TRAINING PROGRAM

## 2025-03-12 PROCEDURE — 250N000009 HC RX 250: Performed by: NURSE ANESTHETIST, CERTIFIED REGISTERED

## 2025-03-12 RX ORDER — DEXAMETHASONE SODIUM PHOSPHATE 4 MG/ML
4 INJECTION, SOLUTION INTRA-ARTICULAR; INTRALESIONAL; INTRAMUSCULAR; INTRAVENOUS; SOFT TISSUE
Status: DISCONTINUED | OUTPATIENT
Start: 2025-03-12 | End: 2025-03-12 | Stop reason: HOSPADM

## 2025-03-12 RX ORDER — POLYETHYLENE GLYCOL 3350 17 G/17G
17 POWDER, FOR SOLUTION ORAL DAILY
Status: DISCONTINUED | OUTPATIENT
Start: 2025-03-13 | End: 2025-03-13 | Stop reason: HOSPADM

## 2025-03-12 RX ORDER — FENTANYL CITRATE 50 UG/ML
50 INJECTION, SOLUTION INTRAMUSCULAR; INTRAVENOUS EVERY 5 MIN PRN
Status: DISCONTINUED | OUTPATIENT
Start: 2025-03-12 | End: 2025-03-12 | Stop reason: HOSPADM

## 2025-03-12 RX ORDER — SODIUM CHLORIDE, SODIUM LACTATE, POTASSIUM CHLORIDE, CALCIUM CHLORIDE 600; 310; 30; 20 MG/100ML; MG/100ML; MG/100ML; MG/100ML
INJECTION, SOLUTION INTRAVENOUS CONTINUOUS
Status: DISCONTINUED | OUTPATIENT
Start: 2025-03-12 | End: 2025-03-13 | Stop reason: HOSPADM

## 2025-03-12 RX ORDER — HYDROMORPHONE HCL IN WATER/PF 6 MG/30 ML
0.2 PATIENT CONTROLLED ANALGESIA SYRINGE INTRAVENOUS EVERY 5 MIN PRN
Status: DISCONTINUED | OUTPATIENT
Start: 2025-03-12 | End: 2025-03-12 | Stop reason: HOSPADM

## 2025-03-12 RX ORDER — ONDANSETRON 4 MG/1
4 TABLET, ORALLY DISINTEGRATING ORAL EVERY 30 MIN PRN
Status: DISCONTINUED | OUTPATIENT
Start: 2025-03-12 | End: 2025-03-12 | Stop reason: HOSPADM

## 2025-03-12 RX ORDER — PROCHLORPERAZINE MALEATE 5 MG/1
5 TABLET ORAL EVERY 6 HOURS PRN
Status: DISCONTINUED | OUTPATIENT
Start: 2025-03-12 | End: 2025-03-13 | Stop reason: HOSPADM

## 2025-03-12 RX ORDER — DEXAMETHASONE SODIUM PHOSPHATE 10 MG/ML
INJECTION, SOLUTION INTRAMUSCULAR; INTRAVENOUS PRN
Status: DISCONTINUED | OUTPATIENT
Start: 2025-03-12 | End: 2025-03-12

## 2025-03-12 RX ORDER — ACETAMINOPHEN 325 MG/1
975 TABLET ORAL ONCE
Status: COMPLETED | OUTPATIENT
Start: 2025-03-12 | End: 2025-03-12

## 2025-03-12 RX ORDER — HYDROMORPHONE HCL IN WATER/PF 6 MG/30 ML
0.4 PATIENT CONTROLLED ANALGESIA SYRINGE INTRAVENOUS EVERY 5 MIN PRN
Status: DISCONTINUED | OUTPATIENT
Start: 2025-03-12 | End: 2025-03-12 | Stop reason: HOSPADM

## 2025-03-12 RX ORDER — ONDANSETRON 2 MG/ML
4 INJECTION INTRAMUSCULAR; INTRAVENOUS EVERY 30 MIN PRN
Status: DISCONTINUED | OUTPATIENT
Start: 2025-03-12 | End: 2025-03-12 | Stop reason: HOSPADM

## 2025-03-12 RX ORDER — SODIUM CHLORIDE, SODIUM LACTATE, POTASSIUM CHLORIDE, CALCIUM CHLORIDE 600; 310; 30; 20 MG/100ML; MG/100ML; MG/100ML; MG/100ML
INJECTION, SOLUTION INTRAVENOUS CONTINUOUS
Status: DISCONTINUED | OUTPATIENT
Start: 2025-03-12 | End: 2025-03-12 | Stop reason: HOSPADM

## 2025-03-12 RX ORDER — NALOXONE HYDROCHLORIDE 0.4 MG/ML
0.2 INJECTION, SOLUTION INTRAMUSCULAR; INTRAVENOUS; SUBCUTANEOUS
Status: DISCONTINUED | OUTPATIENT
Start: 2025-03-12 | End: 2025-03-13 | Stop reason: HOSPADM

## 2025-03-12 RX ORDER — NALOXONE HYDROCHLORIDE 0.4 MG/ML
0.1 INJECTION, SOLUTION INTRAMUSCULAR; INTRAVENOUS; SUBCUTANEOUS
Status: DISCONTINUED | OUTPATIENT
Start: 2025-03-12 | End: 2025-03-12 | Stop reason: HOSPADM

## 2025-03-12 RX ORDER — KETOROLAC TROMETHAMINE 15 MG/ML
15 INJECTION, SOLUTION INTRAMUSCULAR; INTRAVENOUS EVERY 6 HOURS
Status: COMPLETED | OUTPATIENT
Start: 2025-03-12 | End: 2025-03-13

## 2025-03-12 RX ORDER — FENTANYL CITRATE 50 UG/ML
INJECTION, SOLUTION INTRAMUSCULAR; INTRAVENOUS PRN
Status: DISCONTINUED | OUTPATIENT
Start: 2025-03-12 | End: 2025-03-12

## 2025-03-12 RX ORDER — ACETAMINOPHEN 325 MG/1
975 TABLET ORAL EVERY 8 HOURS
Status: DISCONTINUED | OUTPATIENT
Start: 2025-03-12 | End: 2025-03-13 | Stop reason: HOSPADM

## 2025-03-12 RX ORDER — ONDANSETRON 2 MG/ML
INJECTION INTRAMUSCULAR; INTRAVENOUS PRN
Status: DISCONTINUED | OUTPATIENT
Start: 2025-03-12 | End: 2025-03-12

## 2025-03-12 RX ORDER — PROPOFOL 10 MG/ML
INJECTION, EMULSION INTRAVENOUS PRN
Status: DISCONTINUED | OUTPATIENT
Start: 2025-03-12 | End: 2025-03-12

## 2025-03-12 RX ORDER — LIDOCAINE HYDROCHLORIDE 10 MG/ML
INJECTION, SOLUTION INFILTRATION; PERINEURAL PRN
Status: DISCONTINUED | OUTPATIENT
Start: 2025-03-12 | End: 2025-03-12

## 2025-03-12 RX ORDER — CEFAZOLIN SODIUM/WATER 2 G/20 ML
2 SYRINGE (ML) INTRAVENOUS SEE ADMIN INSTRUCTIONS
Status: DISCONTINUED | OUTPATIENT
Start: 2025-03-12 | End: 2025-03-12 | Stop reason: HOSPADM

## 2025-03-12 RX ORDER — AMOXICILLIN 250 MG
1 CAPSULE ORAL 2 TIMES DAILY
Status: DISCONTINUED | OUTPATIENT
Start: 2025-03-12 | End: 2025-03-13 | Stop reason: HOSPADM

## 2025-03-12 RX ORDER — LIDOCAINE 40 MG/G
CREAM TOPICAL
Status: DISCONTINUED | OUTPATIENT
Start: 2025-03-12 | End: 2025-03-12 | Stop reason: HOSPADM

## 2025-03-12 RX ORDER — FENTANYL CITRATE 50 UG/ML
25 INJECTION, SOLUTION INTRAMUSCULAR; INTRAVENOUS EVERY 5 MIN PRN
Status: DISCONTINUED | OUTPATIENT
Start: 2025-03-12 | End: 2025-03-12 | Stop reason: HOSPADM

## 2025-03-12 RX ORDER — OXYCODONE HYDROCHLORIDE 5 MG/1
10 TABLET ORAL
Status: DISCONTINUED | OUTPATIENT
Start: 2025-03-12 | End: 2025-03-12 | Stop reason: HOSPADM

## 2025-03-12 RX ORDER — CIPROFLOXACIN 500 MG/1
500 TABLET, FILM COATED ORAL 2 TIMES DAILY
Status: DISCONTINUED | OUTPATIENT
Start: 2025-03-12 | End: 2025-03-13 | Stop reason: HOSPADM

## 2025-03-12 RX ORDER — NALOXONE HYDROCHLORIDE 0.4 MG/ML
0.4 INJECTION, SOLUTION INTRAMUSCULAR; INTRAVENOUS; SUBCUTANEOUS
Status: DISCONTINUED | OUTPATIENT
Start: 2025-03-12 | End: 2025-03-13 | Stop reason: HOSPADM

## 2025-03-12 RX ORDER — LIDOCAINE 50 MG/G
OINTMENT TOPICAL 4 TIMES DAILY PRN
Status: DISCONTINUED | OUTPATIENT
Start: 2025-03-12 | End: 2025-03-13 | Stop reason: HOSPADM

## 2025-03-12 RX ORDER — OXYCODONE HYDROCHLORIDE 5 MG/1
5 TABLET ORAL
Status: DISCONTINUED | OUTPATIENT
Start: 2025-03-12 | End: 2025-03-12 | Stop reason: HOSPADM

## 2025-03-12 RX ORDER — CEFAZOLIN SODIUM/WATER 2 G/20 ML
2 SYRINGE (ML) INTRAVENOUS
Status: COMPLETED | OUTPATIENT
Start: 2025-03-12 | End: 2025-03-12

## 2025-03-12 RX ORDER — OXYCODONE HYDROCHLORIDE 5 MG/1
5 TABLET ORAL EVERY 6 HOURS PRN
Status: DISCONTINUED | OUTPATIENT
Start: 2025-03-12 | End: 2025-03-13 | Stop reason: HOSPADM

## 2025-03-12 RX ORDER — ONDANSETRON 4 MG/1
4 TABLET, ORALLY DISINTEGRATING ORAL EVERY 6 HOURS PRN
Status: DISCONTINUED | OUTPATIENT
Start: 2025-03-12 | End: 2025-03-13 | Stop reason: HOSPADM

## 2025-03-12 RX ORDER — ONDANSETRON 2 MG/ML
4 INJECTION INTRAMUSCULAR; INTRAVENOUS EVERY 6 HOURS PRN
Status: DISCONTINUED | OUTPATIENT
Start: 2025-03-12 | End: 2025-03-13 | Stop reason: HOSPADM

## 2025-03-12 RX ORDER — LIDOCAINE 40 MG/G
CREAM TOPICAL
Status: DISCONTINUED | OUTPATIENT
Start: 2025-03-12 | End: 2025-03-13 | Stop reason: HOSPADM

## 2025-03-12 RX ADMIN — ACETAMINOPHEN 975 MG: 325 TABLET ORAL at 17:47

## 2025-03-12 RX ADMIN — ACETAMINOPHEN 975 MG: 325 TABLET ORAL at 10:52

## 2025-03-12 RX ADMIN — ROCURONIUM 30 MG: 50 INJECTION, SOLUTION INTRAVENOUS at 13:20

## 2025-03-12 RX ADMIN — FENTANYL CITRATE 50 MCG: 50 INJECTION, SOLUTION INTRAMUSCULAR; INTRAVENOUS at 11:03

## 2025-03-12 RX ADMIN — KETOROLAC TROMETHAMINE 15 MG: 15 INJECTION, SOLUTION INTRAMUSCULAR; INTRAVENOUS at 22:15

## 2025-03-12 RX ADMIN — SODIUM CHLORIDE, POTASSIUM CHLORIDE, SODIUM LACTATE AND CALCIUM CHLORIDE: 600; 310; 30; 20 INJECTION, SOLUTION INTRAVENOUS at 10:54

## 2025-03-12 RX ADMIN — SODIUM CHLORIDE, POTASSIUM CHLORIDE, SODIUM LACTATE AND CALCIUM CHLORIDE: 600; 310; 30; 20 INJECTION, SOLUTION INTRAVENOUS at 13:59

## 2025-03-12 RX ADMIN — CIPROFLOXACIN 500 MG: 500 TABLET ORAL at 20:17

## 2025-03-12 RX ADMIN — PHENYLEPHRINE HYDROCHLORIDE 100 MCG: 10 INJECTION INTRAVENOUS at 11:38

## 2025-03-12 RX ADMIN — FENTANYL CITRATE 50 MCG: 50 INJECTION, SOLUTION INTRAMUSCULAR; INTRAVENOUS at 12:50

## 2025-03-12 RX ADMIN — SODIUM CHLORIDE FOR IRRIGATION 3000 ML: 0.9 SOLUTION IRRIGATION at 17:46

## 2025-03-12 RX ADMIN — ROCURONIUM 10 MG: 50 INJECTION, SOLUTION INTRAVENOUS at 12:02

## 2025-03-12 RX ADMIN — SENNOSIDES AND DOCUSATE SODIUM 1 TABLET: 50; 8.6 TABLET ORAL at 20:17

## 2025-03-12 RX ADMIN — DEXAMETHASONE SODIUM PHOSPHATE 10 MG: 10 INJECTION, SOLUTION INTRAMUSCULAR; INTRAVENOUS at 11:23

## 2025-03-12 RX ADMIN — ROCURONIUM 10 MG: 50 INJECTION, SOLUTION INTRAVENOUS at 12:34

## 2025-03-12 RX ADMIN — KETOROLAC TROMETHAMINE 15 MG: 15 INJECTION, SOLUTION INTRAMUSCULAR; INTRAVENOUS at 17:47

## 2025-03-12 RX ADMIN — SUGAMMADEX 200 MG: 100 INJECTION, SOLUTION INTRAVENOUS at 14:25

## 2025-03-12 RX ADMIN — ROCURONIUM 50 MG: 50 INJECTION, SOLUTION INTRAVENOUS at 11:15

## 2025-03-12 RX ADMIN — PROPOFOL 80 MG: 10 INJECTION, EMULSION INTRAVENOUS at 11:15

## 2025-03-12 RX ADMIN — FENTANYL CITRATE 50 MCG: 50 INJECTION, SOLUTION INTRAMUSCULAR; INTRAVENOUS at 11:11

## 2025-03-12 RX ADMIN — LIDOCAINE HYDROCHLORIDE 5 ML: 10 INJECTION, SOLUTION INFILTRATION; PERINEURAL at 11:15

## 2025-03-12 RX ADMIN — ONDANSETRON 4 MG: 2 INJECTION INTRAMUSCULAR; INTRAVENOUS at 11:23

## 2025-03-12 RX ADMIN — ROCURONIUM 10 MG: 50 INJECTION, SOLUTION INTRAVENOUS at 11:38

## 2025-03-12 RX ADMIN — ROCURONIUM 20 MG: 50 INJECTION, SOLUTION INTRAVENOUS at 12:43

## 2025-03-12 RX ADMIN — PROPOFOL 60 MG: 10 INJECTION, EMULSION INTRAVENOUS at 13:03

## 2025-03-12 RX ADMIN — Medication 2 G: at 11:03

## 2025-03-12 RX ADMIN — FENTANYL CITRATE 50 MCG: 50 INJECTION, SOLUTION INTRAMUSCULAR; INTRAVENOUS at 12:52

## 2025-03-12 RX ADMIN — PHENYLEPHRINE HYDROCHLORIDE 100 MCG: 10 INJECTION INTRAVENOUS at 13:58

## 2025-03-12 RX ADMIN — SODIUM CHLORIDE, POTASSIUM CHLORIDE, SODIUM LACTATE AND CALCIUM CHLORIDE: 600; 310; 30; 20 INJECTION, SOLUTION INTRAVENOUS at 11:03

## 2025-03-12 ASSESSMENT — ACTIVITIES OF DAILY LIVING (ADL)
ADLS_ACUITY_SCORE: 17
ADLS_ACUITY_SCORE: 22
ADLS_ACUITY_SCORE: 17
ADLS_ACUITY_SCORE: 41
ADLS_ACUITY_SCORE: 21
ADLS_ACUITY_SCORE: 17
ADLS_ACUITY_SCORE: 15
ADLS_ACUITY_SCORE: 17
ADLS_ACUITY_SCORE: 22
ADLS_ACUITY_SCORE: 17
ADLS_ACUITY_SCORE: 22

## 2025-03-12 NOTE — ANESTHESIA POSTPROCEDURE EVALUATION
Patient: Yfn Macario    Procedure: Procedure(s):  HOLMIUM LASER ENUCLEATION OF PROSTATE       Anesthesia Type:  General    Note:  Disposition: Inpatient   Postop Pain Control: Uneventful            Sign Out: Well controlled pain   PONV: No   Neuro/Psych: Uneventful            Sign Out: Acceptable/Baseline neuro status   Airway/Respiratory: Uneventful            Sign Out: Acceptable/Baseline resp. status   CV/Hemodynamics: Uneventful            Sign Out: Acceptable CV status; No obvious hypovolemia; No obvious fluid overload   Other NRE: NONE   DID A NON-ROUTINE EVENT OCCUR? No           Last vitals:  Vitals Value Taken Time   /81 03/12/25 1530   Temp 36.5  C (97.7  F) 03/12/25 1445   Pulse 71 03/12/25 1537   Resp 22 03/12/25 1537   SpO2 94 % 03/12/25 1537   Vitals shown include unfiled device data.    Electronically Signed By: Sarah Wachter, MD  March 12, 2025  3:39 PM

## 2025-03-12 NOTE — ANESTHESIA PREPROCEDURE EVALUATION
Anesthesia Pre-Procedure Evaluation    Patient: Yfn Macario   MRN: [id number] : 1955        Procedure : Procedure(s):  HOLMIUM LASER ENUCLEATION OF PROSTATE          Past Medical History:   Diagnosis Date    Anxiety     Balanitis     Benign paroxysmal positional vertigo, bilateral     BPH (benign prostatic hyperplasia)     Cataract, right eye     Chronic kidney disease     Elevated prostate specific antigen (PSA)     HLD (hyperlipidemia)     Hx of chest pain     Lower urinary tract symptoms     Pain in left testicle     Prostate enlargement     Renal insufficiency     Stage 3a chronic kidney disease (CKD) (H)     Tinnitus, unspecified laterality     Traumatic amputation of finger     Urine retention       Past Surgical History:   Procedure Laterality Date    CATARACT EXTRACTION WITH IOL Left         CATARACT EXTRACTION WITH IOL 19 Right       No Known Allergies   Social History     Tobacco Use    Smoking status: Never    Smokeless tobacco: Never   Substance Use Topics    Alcohol use: Never      Wt Readings from Last 1 Encounters:   25 75.3 kg (166 lb)        Anesthesia Evaluation   Pt has not had prior anesthetic         ROS/MED HX  ENT/Pulmonary:  - neg pulmonary ROS     Neurologic:  - neg neurologic ROS     Cardiovascular: Comment: Stress test negative 2021   Resting ECG: Sinus rhythm.    Resting Arrhythmia: None    Stress ECG: Stress ECG noted nonspecific ST segment change. Stress ECG is negative for   myocardial ischemia.    Stress Arrhythmia: Occasional PVCs         METS/Exercise Tolerance: 4 - Raking leaves, gardening    Hematologic:  - neg hematologic  ROS     Musculoskeletal:  - neg musculoskeletal ROS     GI/Hepatic:  - neg GI/hepatic ROS     Renal/Genitourinary:     (+)        BPH,      Endo:  - neg endo ROS     Psychiatric/Substance Use:  - neg psychiatric ROS     Infectious Disease:  - neg infectious disease ROS     Malignancy:       Other:            Physical Exam    Airway  "       Mallampati: II   TM distance: > 3 FB   Neck ROM: full   Mouth opening: > 3 cm    Respiratory Devices and Support         Dental       (+) Minor Abnormalities - some fillings, tiny chips      Cardiovascular   cardiovascular exam normal          Pulmonary   pulmonary exam normal                OUTSIDE LABS:  CBC:   Lab Results   Component Value Date    WBC 6.0 01/30/2020    WBC 6.4 04/11/2019    HGB 16.8 01/30/2020    HGB 16.1 04/11/2019    HCT 48.4 01/30/2020    HCT 45.6 04/11/2019     01/30/2020     04/11/2019     BMP:   Lab Results   Component Value Date     01/30/2020     12/03/2019    POTASSIUM 4.8 01/30/2020    POTASSIUM 4.5 12/03/2019    CHLORIDE 104 01/30/2020    CHLORIDE 106 12/03/2019    CO2 26 01/30/2020    CO2 27 12/03/2019    BUN 27 (H) 01/30/2020    BUN 29 (H) 12/03/2019    CR 1.08 01/30/2020    CR 1.40 (H) 12/03/2019    GLC 81 03/12/2025    GLC 99 01/30/2020     COAGS: No results found for: \"PTT\", \"INR\", \"FIBR\"  POC: No results found for: \"BGM\", \"HCG\", \"HCGS\"  HEPATIC:   Lab Results   Component Value Date    ALBUMIN 4.3 04/11/2019    PROTTOTAL 7.0 04/11/2019    ALT 18 04/11/2019    AST 17 04/11/2019    ALKPHOS 47 04/11/2019    BILITOTAL 0.4 04/11/2019     OTHER:   Lab Results   Component Value Date    KAYLA 9.6 01/30/2020    LIPASE 29 04/11/2019    AMYLASE 49 04/11/2019    TSH 2.60 04/11/2019       Anesthesia Plan    ASA Status:  2    NPO Status:  NPO Appropriate    Anesthesia Type: General.     - Airway: ETT   Induction: Intravenous.   Maintenance: Balanced.        Consents    Anesthesia Plan(s) and associated risks, benefits, and realistic alternatives discussed. Questions answered and patient/representative(s) expressed understanding.     - Discussed: Risks, Benefits and Alternatives for the PROCEDURE were discussed     - Discussed with:  Patient      - Extended Intubation/Ventilatory Support Discussed: No.      - Patient is DNR/DNI Status: No     Use of blood products " discussed: Yes.     - Discussed with: Patient.     - Consented: consented to blood products     Postoperative Care    Pain management: IV analgesics, Oral pain medications.   PONV prophylaxis: Ondansetron (or other 5HT-3), Dexamethasone or Solumedrol     Comments:               Sarah Wachter, MD    I have reviewed the pertinent notes and labs in the chart from the past 30 days and (re)examined the patient.  Any updates or changes from those notes are reflected in this note.    Clinically Significant Risk Factors Present on Admission

## 2025-03-12 NOTE — OP NOTE
Preprocedure diagnosis:   BPH, bladder outlet obstruction  lower urinary tract symptoms   Urinary retention    Postprocedure diagnosis:   BPH, bladder outlet obstruction  lower urinary tract symptoms   Urinary retention    Procedure performed:   1) Holmium Laser Enucleation of the Prostate (HOLEP)     Attending  Monique Costa MD    Findings at Time of Surgery:   Bilobar Hyperplasia without a median lobe  Moderate Trabeculations  No Diverticula  Enucleation Time: 2:16   Morcellation Time: 0:15   Specimen Weight: 57g     Indication for procedure:    Yfn Macario is a 70 year old yo male  who presented with bothersome lower urinary tract symptoms and bladder outlet obstruction requiring wheeler catheter. The risks, benefits, and alternative of HoLEP were explained and he agreed to proceed. He understands there is a risk of temporary incontinence, gross hematuria, urethral stricture or bladder neck contracture, and pain. IV antibiotics were administered prior to procedure start.   Description of the procedure:   After proper patient identification and induction of general anesthesia the patient was prepped and draped in a sterile fashion in the dorsal lithotomy position. Confirmation of appropriate antibiotic administration was made. The  26 Fr laser scope was placed in the urethra. Inspection of the prostate demonstrated Bilobar prostatic obstruction with high riding bladder neck and no median lobe. Inspection of the bladder demonstrated trabeculation throughout, no evidence of bladder tumors, and no evidence of bladder stones.   Next using the Tonny 2.0 system and an 500 micron tonny laser fiber at a setting of 2J and 50 Hz for cutting and 2J and 20 Hz for coagulation the prostate was resected en bloc. The mucosa was incised from the 12 o clock to 5 o clock and 12 o clock to 7 o clock for early apical release. The posterior plane was then developed bilaterally and extended across midline proximal to the  verumontanum. This plane was carried up the sides laterally and the anterior plane developed. Careful preservation of the sphincter was confirmed. The anterior plane was then dissected from the veru through the level of the bladder neck. The prostate was then divided at 12 and 6 o clock. Dissection was carried down on the lateral sides. The posterior plane was then developed and the prostate eventually pushed into the bladder.   The remainder of the attachments were severed using the holmium laser and the prostate was deposited into the bladder.   All bleeding within the prostatic fossa was then controlled with pinpoint lasering using the coagulation settings. Next the nephroscope was placed in the bladder and using two fluid inflow lines the tissue was morcellated using the vargas Piranha morcellator. After all tissue was removed the bladder and prostatic fossa was reinspected using the laserscope. Any residual bleeding vessels were coagulated using the laser. Inspection of the bladder demonstrated no evidence of injury, intact ureteral orifices, and no evidence of residual tissue. The scope was removed and a 22 Fr urethral catheter was placed in the bladder with 50 cc in the balloon. Continuous bladder irrigation was initiated and the patient was transferred to the post anesthesia care unit.     Monique Costa MD was present for the entire procedure.

## 2025-03-12 NOTE — INTERVAL H&P NOTE
I have reviewed the surgical (or preoperative) H&P that is linked to this encounter, and examined the patient. Noted changes include: Positive preoperative culture. Patient has been on 10 days of antibiotics.   No fevers or chills.    Clinical Conditions Present on Arrival:  Clinically Significant Risk Factors Present on Admission

## 2025-03-12 NOTE — ANESTHESIA PROCEDURE NOTES
Airway       Patient location during procedure: OR       Procedure Start/Stop Times: 3/12/2025 11:18 AM  Staff -        Anesthesiologist:  Wachter, Sarah, MD       CRNA: Blu Fry APRN CRNA       Performed By: CRNA  Consent for Airway        Urgency: elective  Indications and Patient Condition       Indications for airway management: domitila-procedural       Induction type:intravenous       Mask difficulty assessment: 2 - vent by mask + OA or adjuvant +/- NMBA    Final Airway Details       Final airway type: endotracheal airway       Successful airway: ETT - single  Endotracheal Airway Details        ETT size (mm): 8.0       Cuffed: yes       Cuff volume (mL): 8       Successful intubation technique: direct laryngoscopy       DL Blade Type: MAC 4       Grade View of Cords: 1       Adjucts: stylet       Position: Right       Measured from: lips       Secured at (cm): 23       Bite block used: None    Post intubation assessment        Placement verified by: capnometry, equal breath sounds and chest rise        Number of attempts at approach: 1       Number of other approaches attempted: 0       Secured with: tape       Ease of procedure: easy       Dentition: Intact and Unchanged    Medication(s) Administered   Medication Administration Time: 3/12/2025 11:18 AM

## 2025-03-12 NOTE — ANESTHESIA CARE TRANSFER NOTE
Patient: Yfn Macario    Procedure: Procedure(s):  HOLMIUM LASER ENUCLEATION OF PROSTATE       Diagnosis: Retention of urine [R33.9]  Diagnosis Additional Information: No value filed.    Anesthesia Type:   General     Note:    Oropharynx: oropharynx clear of all foreign objects  Level of Consciousness: drowsy  Oxygen Supplementation: face mask  Level of Supplemental Oxygen (L/min / FiO2): 8  Independent Airway: airway patency satisfactory and stable  Dentition: dentition unchanged  Vital Signs Stable: post-procedure vital signs reviewed and stable  Report to RN Given: handoff report given  Patient transferred to: PACU    Handoff Report: Identifed the Patient, Identified the Reponsible Provider, Reviewed the pertinent medical history, Discussed the surgical course, Reviewed Intra-OP anesthesia mangement and issues during anesthesia, Set expectations for post-procedure period and Allowed opportunity for questions and acknowledgement of understanding      Vitals:  Vitals Value Taken Time   BP     Temp     Pulse     Resp     SpO2         Electronically Signed By: TIFFANY Hernandez CRNA  March 12, 2025  2:43 PM

## 2025-03-12 NOTE — PHARMACY-ADMISSION MEDICATION HISTORY
Pharmacist Admission Medication History    Admission medication history is complete. The information provided in this note is only as accurate as the sources available at the time of the update.    Information Source(s): Patient and CareEverywhere/SureScripts via in-person    Pertinent Information: Has been on ciprofloxacin for UTI which he stated he started 7-8 days ago. Fill date of 3/4/25 for a 10-day course.    Changes made to PTA medication list:  Added: None  Deleted: None  Changed: None    Allergies reviewed with patient and updates made in EHR: yes    Medication History Completed By: Traci Jimenes RP 3/12/2025 5:17 PM      PTA Med List   Medication Sig Last Dose/Taking    ciprofloxacin (CIPRO) 500 MG tablet Take 500 mg by mouth 2 times daily. 3/12/2025 Morning

## 2025-03-13 VITALS
HEART RATE: 72 BPM | OXYGEN SATURATION: 95 % | TEMPERATURE: 97.3 F | DIASTOLIC BLOOD PRESSURE: 81 MMHG | SYSTOLIC BLOOD PRESSURE: 132 MMHG | BODY MASS INDEX: 23.15 KG/M2 | RESPIRATION RATE: 16 BRPM | WEIGHT: 166 LBS

## 2025-03-13 LAB
ANION GAP SERPL CALCULATED.3IONS-SCNC: 8 MMOL/L (ref 7–15)
BUN SERPL-MCNC: 24.1 MG/DL (ref 8–23)
CALCIUM SERPL-MCNC: 8.2 MG/DL (ref 8.8–10.4)
CHLORIDE SERPL-SCNC: 105 MMOL/L (ref 98–107)
CREAT SERPL-MCNC: 1.07 MG/DL (ref 0.67–1.17)
EGFRCR SERPLBLD CKD-EPI 2021: 74 ML/MIN/1.73M2
ERYTHROCYTE [DISTWIDTH] IN BLOOD BY AUTOMATED COUNT: 11.9 % (ref 10–15)
GLUCOSE BLDC GLUCOMTR-MCNC: 102 MG/DL (ref 70–99)
GLUCOSE SERPL-MCNC: 114 MG/DL (ref 70–99)
HCO3 SERPL-SCNC: 23 MMOL/L (ref 22–29)
HCT VFR BLD AUTO: 41.4 % (ref 40–53)
HGB BLD-MCNC: 14.1 G/DL (ref 13.3–17.7)
MCH RBC QN AUTO: 29.1 PG (ref 26.5–33)
MCHC RBC AUTO-ENTMCNC: 34.1 G/DL (ref 31.5–36.5)
MCV RBC AUTO: 86 FL (ref 78–100)
PLATELET # BLD AUTO: 148 10E3/UL (ref 150–450)
POTASSIUM SERPL-SCNC: 4.5 MMOL/L (ref 3.4–5.3)
RBC # BLD AUTO: 4.84 10E6/UL (ref 4.4–5.9)
SODIUM SERPL-SCNC: 136 MMOL/L (ref 135–145)
WBC # BLD AUTO: 7.6 10E3/UL (ref 4–11)

## 2025-03-13 PROCEDURE — 250N000011 HC RX IP 250 OP 636: Performed by: STUDENT IN AN ORGANIZED HEALTH CARE EDUCATION/TRAINING PROGRAM

## 2025-03-13 PROCEDURE — 85014 HEMATOCRIT: CPT | Performed by: STUDENT IN AN ORGANIZED HEALTH CARE EDUCATION/TRAINING PROGRAM

## 2025-03-13 PROCEDURE — 82962 GLUCOSE BLOOD TEST: CPT

## 2025-03-13 PROCEDURE — 250N000013 HC RX MED GY IP 250 OP 250 PS 637: Performed by: STUDENT IN AN ORGANIZED HEALTH CARE EDUCATION/TRAINING PROGRAM

## 2025-03-13 PROCEDURE — 80048 BASIC METABOLIC PNL TOTAL CA: CPT | Performed by: STUDENT IN AN ORGANIZED HEALTH CARE EDUCATION/TRAINING PROGRAM

## 2025-03-13 PROCEDURE — 85048 AUTOMATED LEUKOCYTE COUNT: CPT | Performed by: STUDENT IN AN ORGANIZED HEALTH CARE EDUCATION/TRAINING PROGRAM

## 2025-03-13 PROCEDURE — 84520 ASSAY OF UREA NITROGEN: CPT | Performed by: STUDENT IN AN ORGANIZED HEALTH CARE EDUCATION/TRAINING PROGRAM

## 2025-03-13 PROCEDURE — 36415 COLL VENOUS BLD VENIPUNCTURE: CPT | Performed by: STUDENT IN AN ORGANIZED HEALTH CARE EDUCATION/TRAINING PROGRAM

## 2025-03-13 RX ORDER — CIPROFLOXACIN 500 MG/1
500 TABLET, FILM COATED ORAL 2 TIMES DAILY
Qty: 4 TABLET | Refills: 0 | Status: SHIPPED | OUTPATIENT
Start: 2025-03-13 | End: 2025-03-15

## 2025-03-13 RX ORDER — OXYCODONE HYDROCHLORIDE 5 MG/1
5 TABLET ORAL EVERY 6 HOURS PRN
Qty: 4 TABLET | Refills: 0 | Status: SHIPPED | OUTPATIENT
Start: 2025-03-13 | End: 2025-03-13

## 2025-03-13 RX ORDER — OXYCODONE HYDROCHLORIDE 5 MG/1
5 TABLET ORAL EVERY 6 HOURS PRN
Qty: 6 TABLET | Refills: 0 | Status: SHIPPED | OUTPATIENT
Start: 2025-03-13

## 2025-03-13 RX ORDER — AMOXICILLIN 250 MG
1 CAPSULE ORAL 2 TIMES DAILY
Qty: 14 TABLET | Refills: 0 | Status: SHIPPED | OUTPATIENT
Start: 2025-03-13 | End: 2025-03-20

## 2025-03-13 RX ORDER — ACETAMINOPHEN 325 MG/1
325-650 TABLET ORAL EVERY 4 HOURS PRN
COMMUNITY
Start: 2025-03-13

## 2025-03-13 RX ADMIN — ACETAMINOPHEN 975 MG: 325 TABLET ORAL at 08:26

## 2025-03-13 RX ADMIN — OXYCODONE HYDROCHLORIDE 5 MG: 5 TABLET ORAL at 12:56

## 2025-03-13 RX ADMIN — CIPROFLOXACIN 500 MG: 500 TABLET ORAL at 08:27

## 2025-03-13 RX ADMIN — POLYETHYLENE GLYCOL 3350 17 G: 17 POWDER, FOR SOLUTION ORAL at 08:27

## 2025-03-13 RX ADMIN — SENNOSIDES AND DOCUSATE SODIUM 1 TABLET: 50; 8.6 TABLET ORAL at 08:27

## 2025-03-13 RX ADMIN — KETOROLAC TROMETHAMINE 15 MG: 15 INJECTION, SOLUTION INTRAMUSCULAR; INTRAVENOUS at 04:31

## 2025-03-13 RX ADMIN — KETOROLAC TROMETHAMINE 15 MG: 15 INJECTION, SOLUTION INTRAMUSCULAR; INTRAVENOUS at 11:34

## 2025-03-13 RX ADMIN — ACETAMINOPHEN 975 MG: 325 TABLET ORAL at 16:00

## 2025-03-13 RX ADMIN — ACETAMINOPHEN 975 MG: 325 TABLET ORAL at 00:37

## 2025-03-13 ASSESSMENT — ACTIVITIES OF DAILY LIVING (ADL)
ADLS_ACUITY_SCORE: 26
ADLS_ACUITY_SCORE: 26
ADLS_ACUITY_SCORE: 24
ADLS_ACUITY_SCORE: 21
ADLS_ACUITY_SCORE: 21
ADLS_ACUITY_SCORE: 26
ADLS_ACUITY_SCORE: 26
ADLS_ACUITY_SCORE: 21
ADLS_ACUITY_SCORE: 24
ADLS_ACUITY_SCORE: 24
ADLS_ACUITY_SCORE: 21
ADLS_ACUITY_SCORE: 21
ADLS_ACUITY_SCORE: 24
ADLS_ACUITY_SCORE: 21

## 2025-03-13 NOTE — PROVIDER NOTIFICATION
1610 MD paged re PVRs today.     1615 MD called back. Gave urine output and PVRs. @ 8919 500cc, PVR: 241cc; @50046 urine unmeasured, PVR:392cc. Waiting for call back if pt should leave with wheeler.

## 2025-03-13 NOTE — PLAN OF CARE
Problem: Adult Inpatient Plan of Care  Goal: Plan of Care Review  Description: The Plan of Care Review/Shift note should be completed every shift.  The Outcome Evaluation is a brief statement about your assessment that the patient is improving, declining, or no change.  This information will be displayed automatically on your shift  note.  Outcome: Progressing   Goal Outcome Evaluation:       Pt alert and oriented x4. Sarmiento removed at 0900 with clear israel urine. Will follow protocol and update urology with PVR scans. Pt does not feel the urge to void yet. Pt bladder scanned at 1415 for 241 in his bladder-Bernardino Null updated per orders. Pt up ambulating in the grossman independently. Pt taking prn oxy for abdominal pain.

## 2025-03-13 NOTE — PLAN OF CARE
Goal Outcome Evaluation:    Wheeler placed and leg bag on. Discharge instruction given. Supplied a 2L night wheeler. Pt wheeled to the front entrance. Wife provide transportation.

## 2025-03-13 NOTE — PLAN OF CARE
"PRIMARY DIAGNOSIS: \"GENERIC\" NURSING  OUTPATIENT/OBSERVATION GOALS TO BE MET BEFORE DISCHARGE:  ADLs back to baseline: Yes    Activity and level of assistance: Ambulating independently.    Pain status: Improved with use of alternative comfort measures i.e.:       Return to near baseline physical activity: Yes     Discharge Planner Nurse   Safe discharge environment identified: Yes  Barriers to discharge: Yes       Entered by: Thalia Smith RN 03/13/2025 11:36 AM     Please review provider order for any additional goals.   Nurse to notify provider when observation goals have been met and patient is ready for discharge.Goal Outcome Evaluation:                  Sarmiento removed per orders at 0900      "

## 2025-03-13 NOTE — DISCHARGE SUMMARY
[facility] DISCHARGE SUMMARY    Name: Yfn Macario  : 1955  MRN: [id number]  PCP: Noemi Fisher    Place of service: [facility]    Admission date: 3/12/2025   Discharge date: 3/13/2025     Surgeon:  Dr. Costa      Surgical Procedure: Holmium Laser Enucleation of the Prostate (HOLEP)      Date of surgery: 3/12/2025    Principal Diagnosis at Discharge:   Retention of urine [R33.9]     Other diagnosis addressed during hospitalization:  UTI prior to hospitalization, remains on po Cipro     Consults:  None    Diagnostic Studies :  None    Complications while in the Hospital:  Post-op urinary retention     Physical Exam:  Temp: 97.3  F (36.3  C) Temp src: Oral BP: 132/81 Pulse: 72   Resp: 16 SpO2: 95 % O2 Device: None (Room air)      Gen: nad, alert  Neuro: A&O x 3. Moving all extremities equally.   Resp: Reg resp rate/depth.   Abdomen: soft, non-distended, non-tender, no SP fullness or tenderness, no bilateral CVA tenderness.   : Uncircumcised penis with foreskin noted retracted, no erythema, minimal edema of foreskin. Foreskin manually reduced over glans. Scrotum without erythema or edema.   LE: CWMS intact. No bilateral LE edema noted.       [facility] - CATHETER PLACEMENT PROCEDURE NOTE 3/13/25, 1700.   Procedure:  The patient's urethra was prepped with Betadine solution. A 16 Fr wheeler catheter was liberally lubricated and inserted with minimal difficulty due to mild resistance at level of bladder. The catheter successfully reached the bladder and the catheter balloon was inflated with 10 cc of sterile saline. Clear light yellow colored urine returned. The patient appeared to tolerate the procedure minimal discomfort.  The catheter was connected to a gravity drainage bag and secured to the Right thigh with elastic velcro strap.       Brief history of hospital course:  This is a 70 year old male admitted for Retention of urine [R33.9]. Patient underwent above procedure. Patient  "tolerated the procedure well. Post operative course was remarkable for post-op urinary retention. Pt voiding low volume with  ml. SP discomfort noted. Wheeler catheter placed with > 300 ml output. By the day of discharge, the patient was ambulatory, tolerating diet, pain was controlled with oral analgesics, labs and vitals stable, passing flatus.Discharged with wheeler catheter.     Labs:  Hemoglobin   Date Value Ref Range Status   03/13/2025 14.1 13.3 - 17.7 g/dL Final   ]  Platelet Count   Date Value Ref Range Status   03/13/2025 148 (L) 150 - 450 10e3/uL Final     WBC Count   Date Value Ref Range Status   03/13/2025 7.6 4.0 - 11.0 10e3/uL Final     Creatinine   Date Value Ref Range Status   03/13/2025 1.07 0.67 - 1.17 mg/dL Final     Potassium   Date Value Ref Range Status   03/13/2025 4.5 3.4 - 5.3 mmol/L Final   01/30/2020 4.8 3.5 - 5.0 mmol/L Final     Comment:     _  Unit of Measure:   mmol/L       No results found for: \"INR\"     Pending Labs:  Surgical Pathology     DISPOSITION: Home    DISCHARGE CONDITION: Good/Stable    DISCHARGE MEDICATIONS:      Review of your medicines        START taking        Dose / Directions   acetaminophen 325 MG tablet  Commonly known as: TYLENOL      Dose: 325-650 mg  Take 1-2 tablets (325-650 mg) by mouth every 4 hours as needed for mild pain.  Refills: 0     oxyCODONE 5 MG tablet  Commonly known as: ROXICODONE      Dose: 5 mg  Take 1 tablet (5 mg) by mouth every 6 hours as needed for moderate to severe pain.  Quantity: 6 tablet  Refills: 0     senna-docusate 8.6-50 MG tablet  Commonly known as: SENOKOT-S/PERICOLACE      Dose: 1 tablet  Take 1 tablet by mouth 2 times daily for 7 days. Discontinue if your stools become loose.  Quantity: 14 tablet  Refills: 0            CONTINUE these medicines which have NOT CHANGED        Dose / Directions   ciprofloxacin 500 MG tablet  Commonly known as: CIPRO      Dose: 500 mg  Take 1 tablet (500 mg) by mouth 2 times daily for 2 " days.  Quantity: 4 tablet  Refills: 0               Where to get your medicines        These medications were sent to [id number] - [place], [place] - [address] &  [address]  [address] [address]      Phone: [phone]   ciprofloxacin 500 MG tablet  oxyCODONE 5 MG tablet  senna-docusate 8.6-50 MG tablet       Some of these will need a paper prescription and others can be bought over the counter. Ask your nurse if you have questions.    You don't need a prescription for these medications  acetaminophen 325 MG tablet         DISCHARGE PLAN:   - Follow up with  Dr. Costa  as scheduled prior to your procedure. [facility] will call you to arrange wheeler catheter removal attempt for 3/17 or 3/18/25. You may call to confirm appt as needed: [phone]  - Follow up with Noemi Fisher as needed.   - Take medication as prescribed, avoid anticoagulants per surgeon and PCP recommendations.   - Wound / drain care: As directed prior to discharge  - Physical activity: As tolerated, no heavy lifting. No driving while taking narcotics.   - Diet: Regular diet. PO fluids encouraged.   - UTI: Pt to complete 10 day cipro course, with last day 3/15/2025.   - Medication: please review discharge Med req/AVS  - Warning signs discussed with patient about when to call the clinic/hospital  - All questions and concerns were answered for the patient prior to discharge  - Patient verbalized understanding.     Discussed patient with  Dr. Costa  on day of discharge.     Bernardino Null APRN, CNP  [facility]   [phone]     I saw the patient on the date of discharge  Total time spent for discharge on date of discharge: 20 minutes    Physician(s) in addition to primary physician who should receive a copy:  CC1: Noemi Fisher           ?

## 2025-03-13 NOTE — PLAN OF CARE
"PRIMARY DIAGNOSIS: \"GENERIC\" NURSING  OUTPATIENT/OBSERVATION GOALS TO BE MET BEFORE DISCHARGE:  ADLs back to baseline: Yes    Activity and level of assistance: Ambulating independently.    Pain status: Pain free.    Return to near baseline physical activity: Yes     Discharge Planner Nurse   Safe discharge environment identified:  no  Barriers to discharge:  n/a       Entered by: Kirk Schaffer RN 03/13/2025 1:10 AM     Please review provider order for any additional goals.   Nurse to notify provider when observation goals have been met and patient is ready for discharge.Goal Outcome Evaluation:    Pt denies pain. VSS on room air.  Output from CBI was yellow/straw color. SCD's are on. Pt wanted to get some sleep. Call light within reach.     "

## 2025-03-13 NOTE — PLAN OF CARE
"PRIMARY DIAGNOSIS: \"GENERIC\" NURSING  OUTPATIENT/OBSERVATION GOALS TO BE MET BEFORE DISCHARGE:  ADLs back to baseline: Yes    Activity and level of assistance: Ambulating independently.    Pain status: Pain free.    Return to near baseline physical activity: Yes     Discharge Planner Nurse   Safe discharge environment identified: No  Barriers to discharge:  n/a       Entered by: Kirk Schaffer RN 03/13/2025 6:50 AM     Please review provider order for any additional goals.   Nurse to notify provider when observation goals have been met and patient is ready for discharge.    Pt A/O x 4. CBI has been turned off during evening. Output from CBI is yellow/israel color all night. SCD's on and VSS on room air. IV saline locked. Call light within reach.   "

## 2025-03-13 NOTE — PLAN OF CARE
"PRIMARY DIAGNOSIS: \"GENERIC\" NURSING  OUTPATIENT/OBSERVATION GOALS TO BE MET BEFORE DISCHARGE:  ADLs back to baseline: Yes    Activity and level of assistance: Ambulating independently.    Pain status: Improved-controlled with oral pain medications.    Return to near baseline physical activity: Yes     Discharge Planner Nurse   Safe discharge environment identified: Yes  Barriers to discharge: No       Entered by: Thalia Smith RN 03/13/2025 1:59 PM     Please review provider order for any additional goals.   Nurse to notify provider when observation goals have been met and patient is ready for discharge.Goal Outcome Evaluation:       Pt has not voided after wheeler removal. Will do PVR per orders.                  "

## 2025-03-13 NOTE — PLAN OF CARE
Goal Outcome Evaluation:       Alejandro had CBI running on a slow drip on arrival to floor. Output from CBI was very light pink. After 2 hours of light pink output CBI was turned off, but remains connected. He ate 50% of dinner. He walked 360 feet and sat in the recliner for awhile. SCD's on. VSS. Call light in reach

## 2025-03-15 LAB
PATH REPORT.COMMENTS IMP SPEC: NORMAL
PATH REPORT.COMMENTS IMP SPEC: NORMAL
PATH REPORT.FINAL DX SPEC: NORMAL
PATH REPORT.GROSS SPEC: NORMAL
PATH REPORT.MICROSCOPIC SPEC OTHER STN: NORMAL
PATH REPORT.RELEVANT HX SPEC: NORMAL
PHOTO IMAGE: NORMAL

## 2025-03-15 PROCEDURE — 88307 TISSUE EXAM BY PATHOLOGIST: CPT | Mod: 26 | Performed by: PATHOLOGY

## (undated) DEVICE — TUBING SUCTION MEDI-VAC 1/4"X20' N620A

## (undated) DEVICE — SOL WATER IRRIG 1000ML BOTTLE 2F7114

## (undated) DEVICE — GLOVE BIOGEL PI SZ 6.5 40865

## (undated) DEVICE — SYR 50ML CATH TIP W/O NDL 309620

## (undated) DEVICE — SUCTION MANIFOLD NEPTUNE 2 SYS 1 PORT 702-025-000

## (undated) DEVICE — SOL NACL 0.9% IRRIG 3000ML BAG 2B7127

## (undated) DEVICE — GUIDEWIRE SENSOR DUAL FLEX STR 0.035"X150CM M0066703080

## (undated) DEVICE — TUBING SET PIRANHA 41702208

## (undated) DEVICE — GLOVE BIOGEL PI ULTRATOUCH G SZ 6.5 42165

## (undated) DEVICE — Device

## (undated) DEVICE — JELLY LUBRICATING SURGILUBE 2OZ TUBE

## (undated) DEVICE — CUSTOM PACK CYSTO PREFERRED SOT5BCYHEA

## (undated) DEVICE — TUBING SET THERMEDX UROLOGY SGL USE LL0006

## (undated) DEVICE — CATH FOLEY 3WAY 22FR 30ML LUBRICATH LATEX 0167L22

## (undated) DEVICE — SYR PISTON URETHRAL 60ML 68000

## (undated) DEVICE — MAT FLOOR WATERPROOF BACKSHEET FMBP30

## (undated) DEVICE — STRAP CATH LEG ADJUSTABLE 0814-8200

## (undated) DEVICE — GLOVE BIOGEL PI SZ 6.0 40860

## (undated) DEVICE — PREP DYNA-HEX 4% CHG SCRUB 4OZ BOTTLE MDS098710

## (undated) DEVICE — TUBING IRR LG BORE TUBE DRIP CHMBR 2 BG 94IN 313003

## (undated) DEVICE — BAG URINARY DRAIN 4000ML LF 153509

## (undated) DEVICE — FILTER PIRANHA DISP 2228.901

## (undated) DEVICE — LASER FIBER HOLMIUM MOSES 550 D/F/L AC-10030120

## (undated) DEVICE — GLOVE BIOGEL PI ULTRATOUCH G SZ 6.0 42160

## (undated) DEVICE — GOWN XXL 9575

## (undated) DEVICE — ANTIFOG SOLUTION W/FOAM PAD 31142527

## (undated) DEVICE — BLADE MORCELLATOR WOLF PIRAHNA 4.75X335MM 49700113

## (undated) DEVICE — SPECIMEN TRAP TISSUE CONTAINER PIRANHA 2208120

## (undated) DEVICE — CATH IV 14GA 2IN REM FLASHPLUG DEHP-FR PVC FR 4251717-02

## (undated) RX ORDER — FENTANYL CITRATE 50 UG/ML
INJECTION, SOLUTION INTRAMUSCULAR; INTRAVENOUS
Status: DISPENSED
Start: 2025-03-12

## (undated) RX ORDER — PROPOFOL 10 MG/ML
INJECTION, EMULSION INTRAVENOUS
Status: DISPENSED
Start: 2025-03-12